# Patient Record
Sex: MALE | Race: WHITE | Employment: FULL TIME | URBAN - METROPOLITAN AREA
[De-identification: names, ages, dates, MRNs, and addresses within clinical notes are randomized per-mention and may not be internally consistent; named-entity substitution may affect disease eponyms.]

---

## 2017-07-07 ENCOUNTER — HOSPITAL ENCOUNTER (OUTPATIENT)
Age: 57
Setting detail: OUTPATIENT SURGERY
Discharge: HOME | End: 2017-07-07
Attending: INTERNAL MEDICINE | Admitting: INTERNAL MEDICINE
Payer: COMMERCIAL

## 2017-07-07 VITALS
TEMPERATURE: 96.9 F | DIASTOLIC BLOOD PRESSURE: 82 MMHG | WEIGHT: 232 LBS | SYSTOLIC BLOOD PRESSURE: 122 MMHG | RESPIRATION RATE: 18 BRPM | HEIGHT: 72 IN | HEART RATE: 97 BPM | OXYGEN SATURATION: 97 % | BODY MASS INDEX: 31.42 KG/M2

## 2017-07-07 PROCEDURE — 77030013991 HC SNR POLYP ENDOSC BSC -A: Performed by: INTERNAL MEDICINE

## 2017-07-07 PROCEDURE — 76040000007: Performed by: INTERNAL MEDICINE

## 2017-07-07 PROCEDURE — 74011250636 HC RX REV CODE- 250/636

## 2017-07-07 PROCEDURE — 74011250636 HC RX REV CODE- 250/636: Performed by: INTERNAL MEDICINE

## 2017-07-07 PROCEDURE — 77030020256 HC SOL INJ NACL 0.9%  500ML: Performed by: INTERNAL MEDICINE

## 2017-07-07 PROCEDURE — 88305 TISSUE EXAM BY PATHOLOGIST: CPT | Performed by: INTERNAL MEDICINE

## 2017-07-07 PROCEDURE — 99153 MOD SED SAME PHYS/QHP EA: CPT | Performed by: INTERNAL MEDICINE

## 2017-07-07 PROCEDURE — 99152 MOD SED SAME PHYS/QHP 5/>YRS: CPT | Performed by: INTERNAL MEDICINE

## 2017-07-07 RX ORDER — DEXTROMETHORPHAN/PSEUDOEPHED 2.5-7.5/.8
1.2 DROPS ORAL
Status: CANCELLED | OUTPATIENT
Start: 2017-07-07

## 2017-07-07 RX ORDER — FLUMAZENIL 0.1 MG/ML
0.2 INJECTION INTRAVENOUS
Status: DISCONTINUED | OUTPATIENT
Start: 2017-07-07 | End: 2017-07-07 | Stop reason: HOSPADM

## 2017-07-07 RX ORDER — MIDAZOLAM HYDROCHLORIDE 1 MG/ML
.5-5 INJECTION, SOLUTION INTRAMUSCULAR; INTRAVENOUS
Status: DISCONTINUED | OUTPATIENT
Start: 2017-07-07 | End: 2017-07-07 | Stop reason: HOSPADM

## 2017-07-07 RX ORDER — EPINEPHRINE 0.1 MG/ML
1 INJECTION INTRACARDIAC; INTRAVENOUS
Status: CANCELLED | OUTPATIENT
Start: 2017-07-07 | End: 2017-07-07

## 2017-07-07 RX ORDER — NALOXONE HYDROCHLORIDE 0.4 MG/ML
0.4 INJECTION, SOLUTION INTRAMUSCULAR; INTRAVENOUS; SUBCUTANEOUS
Status: DISCONTINUED | OUTPATIENT
Start: 2017-07-07 | End: 2017-07-07 | Stop reason: HOSPADM

## 2017-07-07 RX ORDER — FENTANYL CITRATE 50 UG/ML
100 INJECTION, SOLUTION INTRAMUSCULAR; INTRAVENOUS
Status: DISCONTINUED | OUTPATIENT
Start: 2017-07-07 | End: 2017-07-07 | Stop reason: HOSPADM

## 2017-07-07 RX ORDER — SODIUM CHLORIDE 9 MG/ML
100 INJECTION, SOLUTION INTRAVENOUS CONTINUOUS
Status: DISCONTINUED | OUTPATIENT
Start: 2017-07-07 | End: 2017-07-07 | Stop reason: HOSPADM

## 2017-07-07 RX ORDER — ATROPINE SULFATE 0.1 MG/ML
0.5 INJECTION INTRAVENOUS
Status: CANCELLED | OUTPATIENT
Start: 2017-07-07 | End: 2017-07-07

## 2017-07-07 RX ADMIN — SODIUM CHLORIDE 100 ML/HR: 900 INJECTION, SOLUTION INTRAVENOUS at 09:58

## 2017-07-07 NOTE — DISCHARGE INSTRUCTIONS
Vinayak Norton  [id number]  1960    COLON DISCHARGE INSTRUCTIONS    Discomfort:  Redness at IV site- apply warm compress to area; if redness or soreness persist- contact your physician  There may be a slight amount of blood passed from the rectum  Gaseous discomfort- walking, belching will help relieve any discomfort  You may not operate a vehicle til the next day. You may not engage in an occupation involving machinery or appliances til the next day. You may not drink alcoholic beverages til the next day. DIET:   High fiber diet. ACTIVITY:  You may not  resume your normal daily activities til the next day. it is recommended that you spend the remainder of the day resting -  avoid any strenuous activity. CALL M.D.  IF ANY SIGN OF:   Increasing pain, nausea, vomiting  Abdominal distension (swelling)  New increased bleeding (oral or rectal)  Fever (chills)  Pain in chest area  Bloody discharge from nose or mouth  Shortness of breath    You may not  take any Advil, Aspirin, Ibuprofen, Motrin, Aleve, or Goodys for 5 days, ONLY  Tylenol as needed for pain. Post procedure diagnosis:  POLYP, HEMORRHOIDS    Follow-up Instructions: Your follow up colonoscopy will be in 5 years. We will notify you the results of your biopsy by letter within 2 weeks.     Lisbeth Ramos MD  July 7, 2017     DISCHARGE SUMMARY from Nurse    The following personal items collected during your admission are returned to you:   Dental Appliance: Dental Appliances: None  Vision: Visual Aid: Glasses  Hearing Aid:    Jewelry:    Clothing:    Other Valuables:    Valuables sent to safe:              PATIENT INSTRUCTIONS:    After general anesthesia or intravenous sedation, for 24 hours or while taking prescription Narcotics:  · Limit your activities  · Do not drive and operate hazardous machinery  · Do not make important personal or business decisions  · Do  not drink alcoholic beverages  · If you have not urinated within 8 hours after discharge, please contact your surgeon on call. Report the following to your surgeon:  · Excessive pain, swelling, redness or odor of or around the surgical area  · Temperature over 100.5  · Nausea and vomiting lasting longer than 4 hours or if unable to take medications  · Any signs of decreased circulation or nerve impairment to extremity: change in color, persistent  numbness, tingling, coldness or increase pain  · Any questions      No orders of the defined types were placed in this encounter. What to do at Home:  Recommended activity: as above,     If you experience any of the following symptoms as above, please follow up with Dr. Mariely Fatima. *  Please give a list of your current medications to your Primary Care Provider. *  Please update this list whenever your medications are discontinued, doses are      changed, or new medications (including over-the-counter products) are added. *  Please carry medication information at all times in case of emergency situations. These are general instructions for a healthy lifestyle:    No smoking/ No tobacco products/ Avoid exposure to second hand smoke    Surgeon General's Warning:  Quitting smoking now greatly reduces serious risk to your health. Obesity, smoking, and sedentary lifestyle greatly increases your risk for illness    A healthy diet, regular physical exercise & weight monitoring are important for maintaining a healthy lifestyle    You may be retaining fluid if you have a history of heart failure or if you experience any of the following symptoms:  Weight gain of 3 pounds or more overnight or 5 pounds in a week, increased swelling in our hands or feet or shortness of breath while lying flat in bed. Please call your doctor as soon as you notice any of these symptoms; do not wait until your next office visit.     Recognize signs and symptoms of STROKE:    F-face looks uneven    A-arms unable to move or move unevenly    S-speech slurred or non-existent    T-time-call 911 as soon as signs and symptoms begin-DO NOT go       Back to bed or wait to see if you get better-TIME IS BRAIN. The discharge information has been reviewed with the patient and caregiver. The patient and caregiver verbalized understanding. Warning Signs of HEART ATTACK     Call 911 if you have these symptoms:   Chest discomfort. Most heart attacks involve discomfort in the center of the chest that lasts more than a few minutes, or that goes away and comes back. It can feel like uncomfortable pressure, squeezing, fullness, or pain.  Discomfort in other areas of the upper body. Symptoms can include pain or discomfort in one or both arms, the back, neck, jaw, or stomach.  Shortness of breath with or without chest discomfort.  Other signs may include breaking out in a cold sweat, nausea, or lightheadedness. Don't wait more than five minutes to call 911 - MINUTES MATTER! Fast action can save your life. Calling 911 is almost always the fastest way to get lifesaving treatment. Emergency Medical Services staff can begin treatment when they arrive -- up to an hour sooner than if someone gets to the hospital by car. The discharge information has been reviewed with the patient and caregiver. The patient and caregiver verbalized understanding. Discharge medications reviewed with the patient and guardian and appropriate educational materials and side effects teaching were provided.     Patient armband removed and shredded

## 2017-07-07 NOTE — PROCEDURES
[facility]  Colonoscopy Procedure Report  _______________________________________________________  Patient: Guillermo Sabillon                                         Attending Physician: Gerald Dang MD    Patient ID: [id number]                                      Referring Physician: Diana Fu MD    Exam Date: July 7, 2017 _______________________________________________________      Introduction: A  59 y.o. male patient, presents for outpatient Colonoscopy    Indications: Screen colon cancer. Mother had colon cancer at age 59 yo. He is asymptomatic. Had a negative colonoscopy 14 years ago? Consent: The benefits, risks, and alternatives to the procedure were discussed and informed consent was obtained from the patient. Preparation: EKG, pulse, pulse oximetry and blood pressure were monitored throughout the procedure. ASA Classification: Class 2 - . The heart is an S1-S2 and regular heart rate and rhythm. Lungs are clear to auscultation and percussion. Abdomen is soft, nondistended, and nontender. Mental Status: awake, alert, and oriented to person, place, and time    Medications:  · Fentanyl 100 mcg IV before procedure. · Versed 5 mg IV throughout the procedure. Rectal Exam: Normal Rectal Exam. No Blood. Prostate not enlarged. No nodule felt    Pathology Specimens: One specimens removed. Procedure: The colonoscope was passed with ease through the anus under direct visualization and advanced to the cecum and 5 cm inside the terminal ileum. The patient required positioning on the back and external counter pressure to aid in the passage of the scope. The scope was withdrawn and the mucosa was carefully examined. The quality of the preparation was excellent. The views were excellent. The patient's toleration of the procedure was excellent. Retroflexion was preformed in the ascending colon and hepatic flexure. The exam was done twice to the cecum.  Total time is 24 minutes and withdrawal time is 17 minutes. Findings:    Rectum:   Small Internal hemorrhoids. Sigmoid:   normal   Descending Colon:   Normal  Transverse Colon:   3 mm sessile polyp in the transverse colon cold snared  Ascending Colon:   Normal  Cecum:   3 mm sessile polyp in the cecum cold snared   Terminal Ileum:   Normal      Unplanned Events: There were no unplanned events. Estimated Blood Loss: None  Impressions:    Small Internal hemorrhoids. 3 mm sessile polyp in the cecum cold snared and a 3 mm sessile polyp in the transverse colon also cold snared. Normal Mucosa. No diverticula found. Complications: None; patient tolerated the procedure well. Recommendations:  · Discharge home when standard parameters are met. · Resume a high fiber diet. · Colonoscopy recommendation in 5 years.     Procedure Codes:    · Damian Moriah [[id number]]    Endoscope Information:  Model Number(s)    [id number]         Assistant: None      Signed By: Cleone Homans, MD Date: July 7, 2017

## 2017-07-07 NOTE — IP AVS SNAPSHOT
[address] [place] [address] 
[phone] Patient: Marylou Otoole MRN: [id number] FHN:[birth year] You are allergic to the following No active allergies Recent Documentation Height Weight BMI Smoking Status 1.829 m 105.2 kg 31.46 kg/m2 Never Smoker About your hospitalization You were admitted on:  July 7, 2017 You last received care in the:  [facility] ENDOSCOPY You were discharged on:  July 7, 2017 Unit phone number:  [phone] Why you were hospitalized Your primary diagnosis was:  Not on File Providers Seen During Your Hospitalizations Provider Role Specialty Primary office phone Rakesh Rodrigues MD Attending Provider Gastroenterology [phone] Your Primary Care Physician (PCP) Primary Care Physician Office Phone Office Fax Elva Man [phone] Follow-up Information None Current Discharge Medication List  
  
ASK your doctor about these medications Dose & Instructions Dispensing Information Comments Morning Noon Evening Bedtime OTHER Your last dose was: Your next dose is:    
   
   
 Blood pressure med changed and doesn't remember the new med. Refills:  0 Discharge Instructions Marylou Otoole [id number] 
1960 COLON DISCHARGE INSTRUCTIONS Discomfort: 
Redness at IV site- apply warm compress to area; if redness or soreness persist- contact your physician There may be a slight amount of blood passed from the rectum Gaseous discomfort- walking, belching will help relieve any discomfort You may not operate a vehicle til the next day. You may not engage in an occupation involving machinery or appliances til the next day. You may not drink alcoholic beverages til the next day. DIET: 
 High fiber diet.  
  
ACTIVITY: 
 You may not  resume your normal daily activities til the next day. it is recommended that you spend the remainder of the day resting -  avoid any strenuous activity. CALL LEIGH Dixon ANY SIGN OF: Increasing pain, nausea, vomiting Abdominal distension (swelling) New increased bleeding (oral or rectal) Fever (chills) Pain in chest area Bloody discharge from nose or mouth Shortness of breath You may not  take any Advil, Aspirin, Ibuprofen, Motrin, Aleve, or Goodys for 5 days, ONLY  Tylenol as needed for pain. Post procedure diagnosis:  Sarah Vargas Follow-up Instructions: Your follow up colonoscopy will be in 5 years. We will notify you the results of your biopsy by letter within 2 weeks. Edin Paulino MD 
July 7, 2017 DISCHARGE SUMMARY from Nurse The following personal items collected during your admission are returned to you:  
Dental Appliance: Dental Appliances: None Vision: Visual Aid: Glasses Hearing Aid:   
Jewelry:   
Clothing:   
Other Valuables:   
Valuables sent to safe:   
 
 
 
 
 
PATIENT INSTRUCTIONS: 
 
 
F-face looks uneven A-arms unable to move or move unevenly S-speech slurred or non-existent T-time-call 911 as soon as signs and symptoms begin-DO NOT go Back to bed or wait to see if you get better-TIME IS BRAIN. The discharge information has been reviewed with the patient and caregiver. The patient and caregiver verbalized understanding. Warning Signs of HEART ATTACK Call 911 if you have these symptoms: 
? Chest discomfort.  Most heart attacks involve discomfort in the center of the chest that lasts more than a few minutes, or that goes away and comes back. It can feel like uncomfortable pressure, squeezing, fullness, or pain. ? Discomfort in other areas of the upper body. Symptoms can include pain or discomfort in one or both arms, the back, neck, jaw, or stomach. ? Shortness of breath with or without chest discomfort. ? Other signs may include breaking out in a cold sweat, nausea, or lightheadedness. Don't wait more than five minutes to call [address]! Fast action can save your life. Calling 911 is almost always the fastest way to get lifesaving treatment. Emergency Medical Services staff can begin treatment when they arrive  up to an hour sooner than if someone gets to the hospital by car. The discharge information has been reviewed with the patient and caregiver. The patient and caregiver verbalized understanding. Discharge medications reviewed with the patient and guardian and appropriate educational materials and side effects teaching were provided. Patient armband removed and shredded Discharge Orders None Introducing [facility] [place] [facility]! Emeterio Hale introduces [employer] patient portal. Now you can access parts of your medical record, email your doctor's office, and request medication refills online. 1. In your internet browser, go to [email or web address] 2. Click on the First Time User? Click Here link in the Sign In box. You will see the New Member Sign Up page. 3. Enter your [employer] Access Code exactly as it appears below. You will not need to use this code after youve completed the sign-up process. If you do not sign up before the expiration date, you must request a new code. · [employer] Access Code: [email or web address] Expires: 9/20/2017  8:28 AM 
 
4. Enter the last four digits of your Social Security Number (xxxx) and Date of Birth (mm/dd/yyyy) as indicated and click Submit. You will be taken to the next sign-up page. 5. Create a [employer] ID. This will be your [employer] login ID and cannot be changed, so think of one that is secure and easy to remember. 6. Create a [employer] password. You can change your password at any time. 7. Enter your Password Reset Question and Answer. This can be used at a later time if you forget your password. 8. Enter your e-mail address. You will receive e-mail notification when new information is available in [address] 9. Click Sign Up. You can now view and download portions of your medical record. 10. Click the Download Summary menu link to download a portable copy of your medical information. If you have questions, please visit the Frequently Asked Questions section of the [employer] website. Remember, [employer] is NOT to be used for urgent needs. For medical emergencies, dial 911. Now available from your iPhone and Android! General Information Please provide this summary of care documentation to your next provider. Patient Signature:  ____________________________________________________________ Date:  ____________________________________________________________  
  
Abdulkadir Green Provider Signature:  ____________________________________________________________ Date:  ____________________________________________________________

## 2017-07-26 ENCOUNTER — HOSPITAL ENCOUNTER (OUTPATIENT)
Dept: LAB | Age: 57
Discharge: HOME | End: 2017-07-26

## 2017-07-31 ENCOUNTER — HOSPITAL ENCOUNTER (OUTPATIENT)
Dept: NUCLEAR MEDICINE | Age: 57
Discharge: HOME | End: 2017-07-31
Attending: UROLOGY
Payer: COMMERCIAL

## 2017-07-31 DIAGNOSIS — C61 PROSTATE CANCER (HCC): ICD-10-CM

## 2017-07-31 PROCEDURE — 78306 BONE IMAGING WHOLE BODY: CPT

## 2017-08-02 ENCOUNTER — APPOINTMENT (OUTPATIENT)
Dept: GENERAL RADIOLOGY | Age: 57
End: 2017-08-02
Attending: PHYSICIAN ASSISTANT
Payer: COMMERCIAL

## 2017-08-02 ENCOUNTER — HOSPITAL ENCOUNTER (EMERGENCY)
Age: 57
Discharge: HOME | End: 2017-08-02
Attending: FAMILY MEDICINE | Admitting: FAMILY MEDICINE
Payer: COMMERCIAL

## 2017-08-02 ENCOUNTER — APPOINTMENT (OUTPATIENT)
Dept: CT IMAGING | Age: 57
End: 2017-08-02
Attending: PHYSICIAN ASSISTANT
Payer: COMMERCIAL

## 2017-08-02 VITALS
BODY MASS INDEX: 31.83 KG/M2 | HEART RATE: 94 BPM | HEIGHT: 72 IN | SYSTOLIC BLOOD PRESSURE: 137 MMHG | DIASTOLIC BLOOD PRESSURE: 81 MMHG | OXYGEN SATURATION: 95 % | RESPIRATION RATE: 15 BRPM | TEMPERATURE: 99.5 F | WEIGHT: 235 LBS

## 2017-08-02 DIAGNOSIS — C61 PROSTATE CANCER (HCC): ICD-10-CM

## 2017-08-02 DIAGNOSIS — E83.52 HYPERCALCEMIA: Primary | ICD-10-CM

## 2017-08-02 DIAGNOSIS — E83.42 HYPOMAGNESEMIA: ICD-10-CM

## 2017-08-02 DIAGNOSIS — R59.9 ADENOPATHY: ICD-10-CM

## 2017-08-02 PROBLEM — F10.10 ALCOHOL ABUSE: Status: ACTIVE | Noted: 2017-08-02

## 2017-08-02 PROBLEM — I10 ESSENTIAL HYPERTENSION: Chronic | Status: ACTIVE | Noted: 2017-08-02

## 2017-08-02 LAB
ALBUMIN SERPL BCP-MCNC: 3.1 G/DL (ref 3.4–5)
ALBUMIN/GLOB SERPL: 0.8 {RATIO} (ref 0.8–1.7)
ALP SERPL-CCNC: 121 U/L (ref 45–117)
ALT SERPL-CCNC: 33 U/L (ref 16–61)
AMPHET UR QL SCN: NEGATIVE
ANION GAP BLD CALC-SCNC: 7 MMOL/L (ref 3–18)
APPEARANCE UR: CLEAR
AST SERPL W P-5'-P-CCNC: 43 U/L (ref 15–37)
BARBITURATES UR QL SCN: NEGATIVE
BASOPHILS # BLD AUTO: 0 K/UL (ref 0–0.06)
BASOPHILS # BLD: 0 % (ref 0–2)
BENZODIAZ UR QL: NEGATIVE
BILIRUB SERPL-MCNC: 1.3 MG/DL (ref 0.2–1)
BILIRUB UR QL: NEGATIVE
BUN SERPL-MCNC: 13 MG/DL (ref 7–18)
BUN/CREAT SERPL: 9 (ref 12–20)
CALCIUM SERPL-MCNC: 13.7 MG/DL (ref 8.5–10.1)
CANNABINOIDS UR QL SCN: NEGATIVE
CHLORIDE SERPL-SCNC: 100 MMOL/L (ref 100–108)
CO2 SERPL-SCNC: 30 MMOL/L (ref 21–32)
COCAINE UR QL SCN: NEGATIVE
COLOR UR: YELLOW
CREAT SERPL-MCNC: 1.42 MG/DL (ref 0.6–1.3)
DIFFERENTIAL METHOD BLD: ABNORMAL
EOSINOPHIL # BLD: 0.2 K/UL (ref 0–0.4)
EOSINOPHIL NFR BLD: 2 % (ref 0–5)
ERYTHROCYTE [DISTWIDTH] IN BLOOD BY AUTOMATED COUNT: 13 % (ref 11.6–14.5)
GLOBULIN SER CALC-MCNC: 4 G/DL (ref 2–4)
GLUCOSE SERPL-MCNC: 91 MG/DL (ref 74–99)
GLUCOSE UR STRIP.AUTO-MCNC: NEGATIVE MG/DL
HCT VFR BLD AUTO: 40.6 % (ref 36–48)
HDSCOM,HDSCOM: NORMAL
HGB BLD-MCNC: 13.9 G/DL (ref 13–16)
HGB UR QL STRIP: NEGATIVE
KETONES UR QL STRIP.AUTO: NEGATIVE MG/DL
LACTATE SERPL-SCNC: 1.3 MMOL/L (ref 0.4–2)
LEUKOCYTE ESTERASE UR QL STRIP.AUTO: NEGATIVE
LYMPHOCYTES # BLD AUTO: 15 % (ref 21–52)
LYMPHOCYTES # BLD: 1 K/UL (ref 0.9–3.6)
MAGNESIUM SERPL-MCNC: 1.1 MG/DL (ref 1.6–2.6)
MCH RBC QN AUTO: 31.4 PG (ref 24–34)
MCHC RBC AUTO-ENTMCNC: 34.2 G/DL (ref 31–37)
MCV RBC AUTO: 91.9 FL (ref 74–97)
METHADONE UR QL: NEGATIVE
MONOCYTES # BLD: 1.4 K/UL (ref 0.05–1.2)
MONOCYTES NFR BLD AUTO: 20 % (ref 3–10)
NEUTS SEG # BLD: 4.2 K/UL (ref 1.8–8)
NEUTS SEG NFR BLD AUTO: 63 % (ref 40–73)
NITRITE UR QL STRIP.AUTO: NEGATIVE
OPIATES UR QL: NEGATIVE
PCP UR QL: NEGATIVE
PH UR STRIP: 5.5 [PH] (ref 5–8)
PHOSPHATE SERPL-MCNC: 3.8 MG/DL (ref 2.5–4.9)
PLATELET # BLD AUTO: 237 K/UL (ref 135–420)
PMV BLD AUTO: 9.2 FL (ref 9.2–11.8)
POTASSIUM SERPL-SCNC: 3.7 MMOL/L (ref 3.5–5.5)
PROT SERPL-MCNC: 7.1 G/DL (ref 6.4–8.2)
PROT UR STRIP-MCNC: NEGATIVE MG/DL
RBC # BLD AUTO: 4.42 M/UL (ref 4.7–5.5)
SODIUM SERPL-SCNC: 137 MMOL/L (ref 136–145)
SP GR UR REFRACTOMETRY: 1.01 (ref 1–1.03)
UROBILINOGEN UR QL STRIP.AUTO: 0.2 EU/DL (ref 0.2–1)
WBC # BLD AUTO: 6.8 K/UL (ref 4.6–13.2)

## 2017-08-02 PROCEDURE — 96365 THER/PROPH/DIAG IV INF INIT: CPT

## 2017-08-02 PROCEDURE — 81003 URINALYSIS AUTO W/O SCOPE: CPT | Performed by: PHYSICIAN ASSISTANT

## 2017-08-02 PROCEDURE — 96361 HYDRATE IV INFUSION ADD-ON: CPT

## 2017-08-02 PROCEDURE — 99284 EMERGENCY DEPT VISIT MOD MDM: CPT

## 2017-08-02 PROCEDURE — 87040 BLOOD CULTURE FOR BACTERIA: CPT | Performed by: PHYSICIAN ASSISTANT

## 2017-08-02 PROCEDURE — 84100 ASSAY OF PHOSPHORUS: CPT | Performed by: PHYSICIAN ASSISTANT

## 2017-08-02 PROCEDURE — 80053 COMPREHEN METABOLIC PANEL: CPT | Performed by: PHYSICIAN ASSISTANT

## 2017-08-02 PROCEDURE — 85025 COMPLETE CBC W/AUTO DIFF WBC: CPT | Performed by: PHYSICIAN ASSISTANT

## 2017-08-02 PROCEDURE — 83735 ASSAY OF MAGNESIUM: CPT | Performed by: PHYSICIAN ASSISTANT

## 2017-08-02 PROCEDURE — 74011636320 HC RX REV CODE- 636/320: Performed by: FAMILY MEDICINE

## 2017-08-02 PROCEDURE — 93005 ELECTROCARDIOGRAM TRACING: CPT

## 2017-08-02 PROCEDURE — 74011250636 HC RX REV CODE- 250/636: Performed by: PHYSICIAN ASSISTANT

## 2017-08-02 PROCEDURE — 74011250636 HC RX REV CODE- 250/636: Performed by: FAMILY MEDICINE

## 2017-08-02 PROCEDURE — 71020 XR CHEST PA LAT: CPT

## 2017-08-02 PROCEDURE — 80307 DRUG TEST PRSMV CHEM ANLYZR: CPT | Performed by: FAMILY MEDICINE

## 2017-08-02 PROCEDURE — 83605 ASSAY OF LACTIC ACID: CPT | Performed by: PHYSICIAN ASSISTANT

## 2017-08-02 PROCEDURE — 71275 CT ANGIOGRAPHY CHEST: CPT

## 2017-08-02 RX ORDER — MAGNESIUM SULFATE HEPTAHYDRATE 40 MG/ML
2 INJECTION, SOLUTION INTRAVENOUS
Status: DISCONTINUED | OUTPATIENT
Start: 2017-08-02 | End: 2017-08-02 | Stop reason: HOSPADM

## 2017-08-02 RX ORDER — LISINOPRIL 40 MG/1
40 TABLET ORAL
Qty: 30 TAB | Refills: 0 | Status: SHIPPED | OUTPATIENT
Start: 2017-08-02 | End: 2017-08-10

## 2017-08-02 RX ORDER — SODIUM CHLORIDE 0.9 % (FLUSH) 0.9 %
5-10 SYRINGE (ML) INJECTION EVERY 8 HOURS
Status: DISCONTINUED | OUTPATIENT
Start: 2017-08-02 | End: 2017-08-02 | Stop reason: HOSPADM

## 2017-08-02 RX ORDER — LANOLIN ALCOHOL/MO/W.PET/CERES
400 CREAM (GRAM) TOPICAL 2 TIMES DAILY
Qty: 28 TAB | Refills: 0 | Status: ON HOLD | OUTPATIENT
Start: 2017-08-02 | End: 2017-08-09

## 2017-08-02 RX ORDER — SODIUM CHLORIDE 0.9 % (FLUSH) 0.9 %
5-10 SYRINGE (ML) INJECTION AS NEEDED
Status: DISCONTINUED | OUTPATIENT
Start: 2017-08-02 | End: 2017-08-02 | Stop reason: HOSPADM

## 2017-08-02 RX ORDER — LANOLIN ALCOHOL/MO/W.PET/CERES
400 CREAM (GRAM) TOPICAL 2 TIMES DAILY
Status: DISCONTINUED | OUTPATIENT
Start: 2017-08-02 | End: 2017-08-02 | Stop reason: HOSPADM

## 2017-08-02 RX ORDER — MAGNESIUM SULFATE 1 G/100ML
1 INJECTION INTRAVENOUS
Status: COMPLETED | OUTPATIENT
Start: 2017-08-02 | End: 2017-08-02

## 2017-08-02 RX ORDER — LISINOPRIL 20 MG/1
40 TABLET ORAL
Status: DISCONTINUED | OUTPATIENT
Start: 2017-08-02 | End: 2017-08-02 | Stop reason: HOSPADM

## 2017-08-02 RX ADMIN — IOPAMIDOL 100 ML: 755 INJECTION, SOLUTION INTRAVENOUS at 16:26

## 2017-08-02 RX ADMIN — MAGNESIUM SULFATE HEPTAHYDRATE 2 G: 40 INJECTION, SOLUTION INTRAVENOUS at 18:22

## 2017-08-02 RX ADMIN — SODIUM CHLORIDE 3198 ML: 900 INJECTION, SOLUTION INTRAVENOUS at 14:29

## 2017-08-02 RX ADMIN — MAGNESIUM SULFATE HEPTAHYDRATE 1 G: 1 INJECTION, SOLUTION INTRAVENOUS at 16:59

## 2017-08-02 NOTE — ED NOTES
Spoke with Dr. Chris Crowder to clarify admission orders. Patient will be discharged home from ER vs admission to medical unit. Patient made aware.

## 2017-08-02 NOTE — ED TRIAGE NOTES
Patient was seen by Dr. Olinda Hill yesterday and was called today for abnormal lab results. Patient with elevated calcium and decreased magnesium. Sepsis Screening completed    (  )Patient meets SIRS criteria. (x  )Patient does not meet SIRS criteria.       SIRS Criteria is achieved when two or more of the following are present   Temperature < 96.8°F (36°C) or > 100.9°F (38.3°C)   Heart Rate > 90 beats per minute   Respiratory Rate > 20 breaths per minute   WBC count > 12,000 or <4,000 or > 10% bands

## 2017-08-02 NOTE — DISCHARGE SUMMARY
Discharge Summary    Patient: Zackary Dang               Sex: male          DOA: 8/2/2017         YOB: 1960      Age:  59 y.o.        LOS:  LOS: 0 days                Admit Date: 8/2/2017    Discharge Date: 8/2/2017    Admission Diagnoses: Hypercalcemia of malignancy    Discharge Diagnoses:    Problem List as of 8/2/2017  Date Reviewed: 8/2/2017          Codes Class Noted - Resolved    * (Principal)Hypercalcemia of malignancy ICD-10-CM: E83.52  ICD-9-CM: 275.42  8/2/2017 - Present        Hypomagnesemia ICD-10-CM: E83.42  ICD-9-CM: 275.2  8/2/2017 - Present        Prostate cancer metastatic to multiple sites [facility]) ICD-10-CM: C61  ICD-9-CM: 185, 199.0  8/2/2017 - Present        Alcohol abuse ICD-10-CM: F10.10  ICD-9-CM: 305.00  8/2/2017 - Present        Essential hypertension (Chronic) ICD-10-CM: I10  ICD-9-CM: 401.9  8/2/2017 - Present              Discharge Condition: Stable    Hospital Course: Patient evaluated in ED for hypercalcemia and hypomagnesemia. He has known prostate cancer that now seems to have metastasized. I have spoken to him about this he is aware of his diagnosis now, which he was suspecting previously. It is not entirely clear if this is indeed metastatic disease or if it is another primary malignancy, but mets are more likely. He would really like to go home and sleep in his own bed tonight, which I feel is reasonable. He is medically stable and diagnoses can be pursued as an outpatient. 3 total grams of magnesium sulfate were competed prior to discharge. I did also call his PCP and let him know that I spoke to the patient about his dx and what the plan would be moving forward. Mag runs are complete and patient remains stable for DC home. At this point he is medically stable for discharge and has reached the maximum benefit of his hospitalization.       Physical Exam:  Visit Vitals    /81 (BP 1 Location: Right arm, BP Patient Position: At rest)    Pulse 94    Temp 99.5 °F (37.5 °C)    Resp 15    Ht 6' (1.829 m)    Wt 106.6 kg (235 lb)    SpO2 95%    BMI 31.87 kg/m2     General appearance: alert, cooperative, no distress, appears stated age  Head: Normocephalic, without obvious abnormality, atraumatic  Neck: supple, trachea midline, large supraclavicular mass on left  Lungs: coarse bilateral bases L>R  Heart: regular rate and rhythm, S1, S2 normal, no murmur, click, rub or gallop  Abdomen: soft, non-tender. Bowel sounds normal. No masses,  no organomegaly  Extremities: extremities normal, atraumatic, no cyanosis or edema  Skin: Skin color, texture, turgor normal. No rashes or lesions  Neurologic: Grossly normal       Labs: Results:       Chemistry Recent Labs      08/02/17   1444   GLU  91   NA  137   K  3.7   CL  100   CO2  30   BUN  13   CREA  1.42*   CA  13.7*   AGAP  7   BUCR  9*   AP  121*   TP  7.1   ALB  3.1*   GLOB  4.0   AGRAT  0.8      CBC w/Diff Recent Labs      08/02/17   1444   WBC  6.8   RBC  4.42*   HGB  13.9   HCT  40.6   PLT  237   GRANS  63   LYMPH  15*   EOS  2      Cardiac Enzymes No results for input(s): CPK, CKND1, JUAN in the last 72 hours. No lab exists for component: CKRMB, TROIP   Coagulation No results for input(s): PTP, INR, APTT in the last 72 hours. No lab exists for component: INREXT    Lipid Panel No results found for: CHOL, CHOLPOCT, CHOLX, CHLST, CHOLV, 535831, HDL, LDL, LDLC, DLDLP, 954968, VLDLC, VLDL, TGLX, TRIGL, TRIGP, TGLPOCT, CHHD, CHHDX   BNP No results for input(s): BNPP in the last 72 hours. Liver Enzymes Recent Labs      08/02/17   1444   TP  7.1   ALB  3.1*   AP  121*   SGOT  43*      Thyroid Studies No results found for: T4, T3U, TSH, TSHEXT       Consults: None  Treatment Team: Consulting Provider: Yanick Valencia MD; Hospitalist: Nena France DO  Significant Diagnostic Studies: as above    Discharge Medications:   Cannot display discharge medications since this patient is not currently admitted.       Activity: Activity as tolerated    Diet: Resume previous diet    Wound Care: None needed    Follow-up: with PCP in 3-5 days    Megan Perez DO

## 2017-08-02 NOTE — H&P
Medicine History and Physical    Patient: Guy Ramos   Age:  59 y.o. Chief Complaint: Abnormal Lab Results    Assessment/Plan     Hospital Problems  Date Reviewed: 8/2/2017          Codes Class Noted POA    * (Principal)Hypercalcemia of malignancy ICD-10-CM: E83.52  ICD-9-CM: 275.42  8/2/2017 Yes        Hypomagnesemia ICD-10-CM: E83.42  ICD-9-CM: 275.2  8/2/2017 Yes        Prostate cancer metastatic to multiple sites [facility]) ICD-10-CM: C61  ICD-9-CM: 185, 199.0  8/2/2017 Yes        Alcohol abuse ICD-10-CM: F10.10  ICD-9-CM: 305.00  8/2/2017 Yes        Essential hypertension (Chronic) ICD-10-CM: I10  ICD-9-CM: 401.9  8/2/2017 Yes              Patient evaluated in ED for hypercalcemia and hypomagnesemia. He has known prostate cancer that now seems to have metastasized. I have spoken to him about this he is aware of his diagnosis now, which he was suspecting previously. It is not entirely clear if this is indeed metastatic disease or if it is another primary malignancy, but mets are more likely. He would really like to go home and sleep in his own bed tonight, which I feel is reasonable. He is medically stable and diagnoses can be pursued as an outpatient. Will admit under obs status to finish 2 more mag runs and then finish repletion as outpatient. See below for full A/P. Will also call his PCP and let him know what the plan is. Patient agrees with plan.         Hypercalcemia of malignancy (8/2/2017)  - moderate hypercalcemia, asymptomatic   - had 3L NS in ED  - certainly due to bone mets  - no need to aggressively treat this, definitive tx will be for the malignancy      Hypomagnesemia (8/2/2017)  - s/p 1g per ED provider for Mg of 1.1  - would like to give 4g IV over the next 4h but patient would like to go home tonight  - 2g IV magnesium sulfate now over 1h then will dc on PO mag supplement      Prostate cancer metastatic to multiple sites [facility]) (8/2/2017)  - known prostate cancer but seems to now be metastasized   - outpatient follow up with urology appropriate  - defer to urology for ultimate treatment plan      Essential hypertension (8/2/2017)  - stop diuretics in the setting of hypercalcemia  - will bump up lisinopril to 40mg qHS    Dispo: hold in ED obersvation status; anticipated length of stay less than 48 hours or 2 midnights; complete IV mag runs, then DC home; final discharge disposition anticipated: home with close outpatient follow up      HPI:   Kassandra Michel is a 59 y.o. male who presents to the ED for evaluation of abnormal labs (elevated Ca and low Mg) which were concerning in the setting of pre-existing HTN. He was sent her by his PCP, Dr. Chelo Mason. He reports dry cough x 2 months but had a normal CXR. He has also had worsening fatigue during this time. He has a history of prostate cancer managed by Dr. Edmundo Mitchell. He had a normal c-scope 3 weeks ago. He does admit to pelvic pain and a growing mass in his left supra-clavicular space that his daughter just noticed yesterday. Past Medical History:  Past Medical History:   Diagnosis Date    Cancer [facility])     testicle and prostate    Elevated PSA     Hypertension     Prostate cancer [facility])        Past Surgical History:  Past Surgical History:   Procedure Laterality Date    COLONOSCOPY N/A 7/7/2017    COLONOSCOPY, POLYPECTOMY  performed by Yesica Cee MD at [address] [place] [address] Road      tibia right and left; fibula right    HX ORCHIECTOMY      HX ORTHOPAEDIC      HX TONSIL AND ADENOIDECTOMY      HX UROLOGICAL      testicle     HX WRIST FRACTURE TX Left     pins and screws        Family History:  History reviewed. No pertinent family history.     Social History:  Social History     Social History    Marital status:      Spouse name: N/A    Number of children: N/A    Years of education: N/A     Social History Main Topics    Smoking status: Never Smoker    Smokeless tobacco: Never Used    Alcohol use 14.4 oz/week     24 Cans of beer per week    Drug use: No    Sexual activity: Not Asked     Other Topics Concern    None     Social History Narrative    ** Merged History Encounter **            Home Medications:  Prior to Admission medications    Medication Sig Start Date End Date Taking? Authorizing Provider   clobetasol (TEMOVATE) 0.05 % topical cream APPLY A THIN LAYER TO THE AFFECTED AREA(S) 2 TIMES DAILY 6/30/17   Historical Provider   lisinopril-hydroCHLOROthiazide (PRINZIDE, ZESTORETIC) 20-25 mg per tablet TAKE 1 TABLET BY MOUTH EVERY DAY 6/1/17   Historical Provider   LORazepam (ATIVAN) 1 mg tablet TAKE 1 TABLET BY MOUTH 45 MINUTES PRIOR TO PROCEDURE 5/30/17   Historical Provider   losartan-hydroCHLOROthiazide (HYZAAR) 50-12.5 mg per tablet TAKE 1 TABLET BY MOUTH EVERY DAY 6/30/17   Historical Provider   nystatin-triamcinolone (MYCOLOG II) topical cream APPLY TO AFFECTED AREA 2 TIMES EVERY DAY TO ANKEL IN THE MORNING AND EVENING 5/2/17   Historical Provider   SUPREP BOWEL PREP KIT 17.5-3.13-1.6 gram solr oral solution TAKE AS PRESCRIBED BY PHYSICIAN 6/12/17   Historical Provider   OTHER Blood pressure med changed and doesn't remember the new med. Historical Provider       Allergies:  No Known Allergies    Review of Systems  A comprehensive review of systems was negative except for that written in the History of Present Illness.     Physical Exam:     Visit Vitals    /81 (BP 1 Location: Right arm, BP Patient Position: At rest)    Pulse 94    Temp 99.5 °F (37.5 °C)    Resp 15    Ht 6' (1.829 m)    Wt 106.6 kg (235 lb)    SpO2 95%    BMI 31.87 kg/m2       Physical Exam:   General appearance: alert, cooperative, no distress, appears stated age  Head: Normocephalic, without obvious abnormality, atraumatic  Neck: supple, trachea midline, large supraclavicular mass on left  Lungs: coarse bilateral bases L>R  Heart: regular rate and rhythm, S1, S2 normal, no murmur, click, rub or gallop  Abdomen: soft, non-tender. Bowel sounds normal. No masses,  no organomegaly  Extremities: extremities normal, atraumatic, no cyanosis or edema  Skin: Skin color, texture, turgor normal. No rashes or lesions  Neurologic: Grossly normal    Intake and Output:  Current Shift:     Last three shifts:       Lab/Data Reviewed: All lab results for the last 24 hours reviewed. Medications Reviewed.     Dian Smith DO

## 2017-08-02 NOTE — ED PROVIDER NOTES
[address]  EMERGENCY DEPARTMENT HISTORY AND PHYSICAL EXAM       Date: 8/2/2017   Patient Name: Precious Rowan   YOB: 1960  Medical Record Number: [id number]    History of Presenting Illness     Chief Complaint   Patient presents with    Abnormal Lab Results        History Provided By:  patient    Additional History: 1:38 PM  Precious Rowan is a 59 y.o. male who presents to the emergency department for evaluation of abnormal lab results. Pt seen by Dr. Reena Fraire yesterday for dry cough x 2 months (that pt reports is different than allergies) and called in today for abnormal lab results. Pt has elevated new Calcium and decreased new Magnesium and prompted into ED due to cardiac problems. Associated sx include fatigue x 2 months. NKDA. Recent CXR was clean. Pt reports lump in neck was found. PMHx of cancer, HTN (recent medication change 1.5 months ago with no changes to cough), Prostate cancer (verified by MRI; managed by Dr. Alex Norris), and elevated PSA. PSHx of testicular surgery, orthopedic surgery, fracture tx to right Tib/fib and left tibia, pins and screws in wrist, tonsillectomy, adenoidectomy, and a routine colonoscopy 3 weeks ago (no call back; few polyp removals). Pt is a non smoker and an EtOH user. Pt denies SOB, leg swelling, fever, diarrhea, abdominal pain, and any other associated signs and sx.     Primary Care Provider: Carmel Phan MD   Specialist:    Past History     Past Medical History:   Past Medical History:   Diagnosis Date    Cancer [facility])     testicle and prostate    Elevated PSA     Hypertension     Prostate cancer [facility])         Past Surgical History:   Past Surgical History:   Procedure Laterality Date    COLONOSCOPY N/A 7/7/2017    COLONOSCOPY, POLYPECTOMY  performed by Mary Regalado MD at [address]      tibia right and left; fibula right    HX ORCHIECTOMY      HX ORTHOPAEDIC      HX TONSIL AND ADENOIDECTOMY      HX UROLOGICAL      testicle     HX WRIST FRACTURE TX Left     pins and screws         Family History:   History reviewed. No pertinent family history. Social History:   Social History   Substance Use Topics    Smoking status: Never Smoker    Smokeless tobacco: Never Used    Alcohol use 14.4 oz/week     24 Cans of beer per week        Allergies:   No Known Allergies     Review of Systems   Review of Systems   Constitutional: Positive for fatigue and unexpected weight change. Negative for fever. Respiratory: Positive for cough. Negative for shortness of breath. Cardiovascular: Negative for leg swelling. Gastrointestinal: Negative for abdominal pain and diarrhea. Neurological: Positive for weakness (generalized). All other systems reviewed and are negative. Physical Exam  Vitals:    08/02/17 1314 08/02/17 1826   BP: 138/85 137/81   Pulse: (!) 107 94   Resp: 20 15   Temp: 98.7 °F (37.1 °C) 99.5 °F (37.5 °C)   SpO2: 94% 95%   Weight: 106.6 kg (235 lb)    Height: 6' (1.829 m)        Physical Exam   Constitutional: He is oriented to person, place, and time. He appears well-developed and well-nourished. No distress. Overweigh, in NAD   HENT:   Head: Normocephalic and atraumatic. Eyes: Conjunctivae and EOM are normal. Pupils are equal, round, and reactive to light. Neck: Normal range of motion. Neck supple. Cardiovascular: Normal rate and regular rhythm. Pulmonary/Chest: Effort normal and breath sounds normal. No respiratory distress. He has no wheezes. Abdominal: Soft. He exhibits no distension. There is no tenderness. There is no rebound and no guarding. Musculoskeletal: Normal range of motion. He exhibits no edema or tenderness. Lymphadenopathy:        Left: Supraclavicular (golf ball sized slightly moveable non tender fleshy colored) adenopathy present. Neurological: He is alert and oriented to person, place, and time. Skin: Skin is warm and dry.    Psychiatric: He has a normal mood and affect. His behavior is normal.   Nursing note and vitals reviewed. Diagnostic Study Results     Labs -      No results found for this or any previous visit (from the past 12 hour(s)). Radiologic Studies -  The following have been ordered and reviewed:  CTA CHEST W OR W WO CONT   Final Result   IMPRESSION:     1. Technically limited examination as above secondary to persistent coughing.     2. No evidence of large, central pulmonary embolism.     3. Left supraclavicular and multistation mediastinal adenopathy most concerning  for underlying malignancy, with differential considerations including a  manifestation of metastatic disease versus lymphoproliferative process such as  lymphoma.     4. Small hiatal hernia.     Technical limitations and findings of this examination discussed with Lucero Medrano PA-C in the emergency room at the time of the examination. As read by the radiologist.    XR CHEST PA LAT   Final Result   IMPRESSION:     Left lower lobe atelectasis, cannot entirely exclude streaky infiltrate. As read by the radiologist.            Medical Decision Making   I am the first provider for this patient. I reviewed the vital signs, available nursing notes, past medical history, past surgical history, family history and social history. Vital Signs-Reviewed the patient's vital signs. No data found. Pulse Oximetry Analysis - Normal 94% on room air     EKG interpretation: (Preliminary)  Rhythm: NSR. Rate (approx.): 92 bpm; Low voltage QRS. Cannot rule out anterior infarct, age undetermined. EKG read by Marcial Sainz PA-C at 5:17 PM    Old Medical Records: Nursing notes. Procedures:   Procedures    ED Course:  1:38 PM  Initial assessment performed. The patients presenting problems have been discussed, and they are in agreement with the care plan formulated and outlined with them.   I have encouraged them to ask questions as they arise throughout their visit.    2:20 PM  Reviewed EMR nuc med bone scan full body which was performed 2 days ago. Reveals asymmetric uptake involving the medial aspect of the left iliac bone concerning for osseous metastatic disease. 5:22 PM Joel Barrera DO, internal medicine, reviewed the chart. He will put the patient in observation status for electrolyte correction. Will have Mehdi Shelton MD or sena onc evaluate the patient. 5:23 PM  Patient is being admitted to the hospital by Joel Barrera DO to Medical under observation status. The results of their tests and reasons for their admission have been discussed with them and/or available family. They convey agreement and understanding for the need to be admitted and for their admission diagnosis. CONDITIONS ON ADMISSION:  Deep Vein Thrombosis is not present at the time of admission. Thrombosis is not present at the time of admission. Urinary Tract Infection is not present at the time of admission. Pneumonia is not present at the time of admission. MRSA is not present at the time of admission. Wound infection is not present at the time of admission. Pressure Ulcer is not present at the time of admission. Medications Given in the ED:  Medications   sodium chloride 0.9 % bolus infusion 3,198 mL (0 mL/kg × 106.6 kg IntraVENous IV Completed 8/2/17 1853)   magnesium sulfate 1 g/100 ml IVPB (premix or compounded) (0 g IntraVENous IV Completed 8/2/17 1759)   iopamidol (ISOVUE-370) 76 % injection 100 mL (100 mL IntraVENous Given 8/2/17 1626)     Diagnosis   Clinical Impression:   1. Hypercalcemia    2. Hypomagnesemia    3. Adenopathy    4. Prostate cancer ([address]         _______________________________   Attestations: This note is prepared by James Koenig and Melanie Klein, acting as Scribes for Marcial Sainz PA-C on 1:31 PM on 8/2/2017.     Marcial Sainz PA-C: The scribe's documentation has been prepared under my direction and personally reviewed by me in its entirety.   _______________________________

## 2017-08-04 NOTE — PROGRESS NOTES
Robles Pyle, pt's daughter. aware of need to hold anticoagulants per protocol. Denies. aware of  need for  at discharge. aware of arrival time pre procedure. Arrive at [facility] pt registration on Monday 8/7/17 at 1330 for scheduled procedure to occur at 1400. Daughter stated that pt may possibly be admitted to [facility] on Sunday by MD.  states no questions at this time. Gave pt's daughter [facility] rad rn phone number [phone].

## 2017-08-06 ENCOUNTER — HOSPITAL ENCOUNTER (INPATIENT)
Age: 57
LOS: 4 days | Discharge: ANOTHER HOSPITAL | DRG: 824 | End: 2017-08-10
Attending: INTERNAL MEDICINE | Admitting: INTERNAL MEDICINE
Payer: COMMERCIAL

## 2017-08-06 PROBLEM — E86.0 DEHYDRATION: Status: ACTIVE | Noted: 2017-08-06

## 2017-08-06 PROBLEM — E44.0 PROTEIN-CALORIE MALNUTRITION, MODERATE (HCC): Status: ACTIVE | Noted: 2017-08-06

## 2017-08-06 PROBLEM — C79.82 METASTATIC ADENOCARCINOMA TO PROSTATE (HCC): Status: ACTIVE | Noted: 2017-08-06

## 2017-08-06 PROBLEM — R05.9 COUGH: Status: ACTIVE | Noted: 2017-08-06

## 2017-08-06 LAB
ALBUMIN SERPL BCP-MCNC: 2.9 G/DL (ref 3.4–5)
ALBUMIN/GLOB SERPL: 0.7 {RATIO} (ref 0.8–1.7)
ALP SERPL-CCNC: 116 U/L (ref 45–117)
ALT SERPL-CCNC: 33 U/L (ref 16–61)
ANION GAP BLD CALC-SCNC: 13 MMOL/L (ref 3–18)
AST SERPL W P-5'-P-CCNC: 41 U/L (ref 15–37)
BASOPHILS # BLD AUTO: 0 K/UL (ref 0–0.06)
BASOPHILS # BLD: 0 % (ref 0–2)
BILIRUB SERPL-MCNC: 0.9 MG/DL (ref 0.2–1)
BUN SERPL-MCNC: 12 MG/DL (ref 7–18)
BUN/CREAT SERPL: 8 (ref 12–20)
CA-I SERPL-SCNC: 1.64 MMOL/L (ref 1.12–1.32)
CALCIUM SERPL-MCNC: 11.9 MG/DL (ref 8.5–10.1)
CHLORIDE SERPL-SCNC: 100 MMOL/L (ref 100–108)
CO2 SERPL-SCNC: 24 MMOL/L (ref 21–32)
CREAT SERPL-MCNC: 1.42 MG/DL (ref 0.6–1.3)
DIFFERENTIAL METHOD BLD: ABNORMAL
EOSINOPHIL # BLD: 0.2 K/UL (ref 0–0.4)
EOSINOPHIL NFR BLD: 3 % (ref 0–5)
ERYTHROCYTE [DISTWIDTH] IN BLOOD BY AUTOMATED COUNT: 13 % (ref 11.6–14.5)
GLOBULIN SER CALC-MCNC: 4.2 G/DL (ref 2–4)
GLUCOSE SERPL-MCNC: 86 MG/DL (ref 74–99)
HCT VFR BLD AUTO: 38.4 % (ref 36–48)
HGB BLD-MCNC: 13.3 G/DL (ref 13–16)
LDH SERPL L TO P-CCNC: 289 U/L (ref 81–234)
LYMPHOCYTES # BLD AUTO: 17 % (ref 21–52)
LYMPHOCYTES # BLD: 1.2 K/UL (ref 0.9–3.6)
MAGNESIUM SERPL-MCNC: 1.3 MG/DL (ref 1.6–2.6)
MCH RBC QN AUTO: 31.6 PG (ref 24–34)
MCHC RBC AUTO-ENTMCNC: 34.6 G/DL (ref 31–37)
MCV RBC AUTO: 91.2 FL (ref 74–97)
MONOCYTES # BLD: 1.5 K/UL (ref 0.05–1.2)
MONOCYTES NFR BLD AUTO: 20 % (ref 3–10)
NEUTS SEG # BLD: 4.5 K/UL (ref 1.8–8)
NEUTS SEG NFR BLD AUTO: 60 % (ref 40–73)
PLATELET # BLD AUTO: 215 K/UL (ref 135–420)
PMV BLD AUTO: 9.1 FL (ref 9.2–11.8)
POTASSIUM SERPL-SCNC: 3.4 MMOL/L (ref 3.5–5.5)
PROT SERPL-MCNC: 7.1 G/DL (ref 6.4–8.2)
RBC # BLD AUTO: 4.21 M/UL (ref 4.7–5.5)
SODIUM SERPL-SCNC: 137 MMOL/L (ref 136–145)
WBC # BLD AUTO: 7.4 K/UL (ref 4.6–13.2)

## 2017-08-06 PROCEDURE — 82330 ASSAY OF CALCIUM: CPT | Performed by: INTERNAL MEDICINE

## 2017-08-06 PROCEDURE — 83970 ASSAY OF PARATHORMONE: CPT | Performed by: INTERNAL MEDICINE

## 2017-08-06 PROCEDURE — 65270000029 HC RM PRIVATE

## 2017-08-06 PROCEDURE — 83615 LACTATE (LD) (LDH) ENZYME: CPT | Performed by: INTERNAL MEDICINE

## 2017-08-06 PROCEDURE — 74011250637 HC RX REV CODE- 250/637: Performed by: HOSPITALIST

## 2017-08-06 PROCEDURE — 84154 ASSAY OF PSA FREE: CPT | Performed by: INTERNAL MEDICINE

## 2017-08-06 PROCEDURE — 36415 COLL VENOUS BLD VENIPUNCTURE: CPT | Performed by: INTERNAL MEDICINE

## 2017-08-06 PROCEDURE — 80053 COMPREHEN METABOLIC PANEL: CPT | Performed by: INTERNAL MEDICINE

## 2017-08-06 PROCEDURE — 74011250636 HC RX REV CODE- 250/636: Performed by: INTERNAL MEDICINE

## 2017-08-06 PROCEDURE — 85025 COMPLETE CBC W/AUTO DIFF WBC: CPT | Performed by: INTERNAL MEDICINE

## 2017-08-06 PROCEDURE — 83735 ASSAY OF MAGNESIUM: CPT | Performed by: INTERNAL MEDICINE

## 2017-08-06 RX ORDER — CHOLECALCIFEROL (VITAMIN D3) 125 MCG
10 CAPSULE ORAL
Status: DISCONTINUED | OUTPATIENT
Start: 2017-08-06 | End: 2017-08-10 | Stop reason: HOSPADM

## 2017-08-06 RX ORDER — BENZONATATE 100 MG/1
100 CAPSULE ORAL
COMMUNITY
End: 2019-08-02

## 2017-08-06 RX ORDER — DIPHENHYDRAMINE HCL 25 MG
50 CAPSULE ORAL
Status: DISCONTINUED | OUTPATIENT
Start: 2017-08-06 | End: 2017-08-10 | Stop reason: HOSPADM

## 2017-08-06 RX ORDER — TAMSULOSIN HYDROCHLORIDE 0.4 MG/1
0.4 CAPSULE ORAL DAILY
Status: ON HOLD | COMMUNITY
End: 2017-08-09

## 2017-08-06 RX ORDER — SODIUM CHLORIDE 9 MG/ML
125 INJECTION, SOLUTION INTRAVENOUS CONTINUOUS
Status: DISCONTINUED | OUTPATIENT
Start: 2017-08-06 | End: 2017-08-10

## 2017-08-06 RX ORDER — LORAZEPAM 2 MG/ML
1 INJECTION INTRAMUSCULAR
Status: DISCONTINUED | OUTPATIENT
Start: 2017-08-06 | End: 2017-08-10 | Stop reason: HOSPADM

## 2017-08-06 RX ORDER — ZOLEDRONIC ACID 0.04 MG/ML
4 INJECTION, SOLUTION INTRAVENOUS ONCE
Status: COMPLETED | OUTPATIENT
Start: 2017-08-06 | End: 2017-08-06

## 2017-08-06 RX ORDER — HYDROCODONE BITARTRATE AND ACETAMINOPHEN 5; 325 MG/1; MG/1
1 TABLET ORAL
Status: DISCONTINUED | OUTPATIENT
Start: 2017-08-06 | End: 2017-08-10 | Stop reason: HOSPADM

## 2017-08-06 RX ORDER — ACETAMINOPHEN 325 MG/1
650 TABLET ORAL
Status: DISCONTINUED | OUTPATIENT
Start: 2017-08-06 | End: 2017-08-07

## 2017-08-06 RX ADMIN — Medication 10 MG: at 21:35

## 2017-08-06 RX ADMIN — SODIUM CHLORIDE 125 ML/HR: 900 INJECTION, SOLUTION INTRAVENOUS at 18:57

## 2017-08-06 RX ADMIN — ZOLEDRONIC ACID 100 ML: 0.04 INJECTION, SOLUTION INTRAVENOUS at 21:35

## 2017-08-06 NOTE — H&P
History & Physical    Patient: Joleen Duarte MRN: [id number]  CSN: [id number]    YOB: 1960  Age: 59 y.o. Sex: male      DOA: 8/6/2017  Primary Care Provider:  Julieth Weathers MD      Assessment/Plan     Patient Active Problem List   Diagnosis Code    Hypercalcemia of malignancy E83.52    Hypomagnesemia E83.42    Prostate cancer metastatic to multiple sites [facility]) C61    Alcohol abuse F10.10    Essential hypertension I10    Metastatic adenocarcinoma to prostate ([facility] C79.82       1. Prostate cancer possibly metastatic. 2.  Lymphadenopathy. 3.  Hypomagnesemia. 4.  Hypercalcemia severe probably 2/2 malignancy. 5.  Hypoalbuminemia. Mod protein malnutrition. 6.  Dehydration. 7.  Cough x 2-3 months. 8.  Hypertension. 9.  Alcohol use 3-4 beers per night. Complex case. Prostate cancer identified on 1/12 biopsies. Diagnostic studies with possible iliac lesion on left and diffuse large lymphadenopathy throughout the chest and a large supraclavicular lymph node in the neck. Calcium abnormally elevated that might be due to metastatic disease vs cancer secreted PTH vs hyperparathyroidism. Lingering cough present in this non smoker. Common causes such as ACE related, allergies, gerd, uri etc have been excluded. Could it be related to the abnormalities noted on the CT scan? Discussed the case with yo LINDA. [facility]. Reviewed treatment plan that will include iv fluids for sever hypercalcemia, consideration of zometa, and correction of electrolyte abnormalities. DVT px. Dispo TBD. Anticipate dc home in 3 days or so. Do not anticipate any HH or PT/OT needs. CC: Malaise. HPI:     Joleen Duarte is a 59 y.o. male who was referred to Dr. Andrew House  For elevated PSA. He underwent MR guided biopsy at [facility]. MRI w/o abnormalities but one of twelve cores showed Khai 3+4 or 4+3 prostate cancer in a small volume specimen.   MRI mentions possible abnormality of pelvic bones. Diagnosed with prostate cancer in 6/2017. Most recent PSA dated 3/29/17 was 6. Since that time has been exploring treatment options. Initially he was interested in proton and or radiation. Apparently he has a history of testicular cancer in the early 1990's and he received radiation as part of his treatment protocol. On 8/2/17 underwent NM bone scan. Testing showed left sided iliac involvement hat might represent metastatic disease. Moderate uptake was noted in the cervical spine that might be degenerative in quality. Over the past couple months has developed a cough. He was on an ACE. This was stopped. Cough continued. An antihistamine was tried. This did not help the cough. A chest xray was completed as an outpatient that was unrevealing. He has had no purulent sputum or blood. There has been no fevers or chills. A CT scan of the chest was completed on 8/2/17. This demonstrated   Significant left supraclavicular and multi-station mediastinal lymphadenopathy. Radiology commented that this could represent metastatic disease vs a lymphoma. In the past week a palpable lymph node above the left clavicle has appeared. It has been tender. Recent laboratory studies have found hypercalcemia, low albumin and severe dangerously low magnesium. AS he has been doing poorly with this process that has yet to be worked up and as his labs have manifested severe electrolyte abnormalities the decision was made to admit for further management.         Past Medical History:   Diagnosis Date    Cancer [facility])     testicle and prostate    Elevated PSA     Hypertension     Prostate cancer [facility])        Past Surgical History:   Procedure Laterality Date    COLONOSCOPY N/A 7/7/2017    COLONOSCOPY, POLYPECTOMY  performed by Loraine Luciano MD at [address] [place] [address]      tibia right and left; fibula right    HX ORCHIECTOMY      HX ORTHOPAEDIC      HX TONSIL AND ADENOIDECTOMY      HX UROLOGICAL      testicle     HX WRIST FRACTURE TX Left     pins and screws        No family history on file. Social History     Social History    Marital status:      Spouse name: N/A    Number of children: N/A    Years of education: N/A     Social History Main Topics    Smoking status: Never Smoker    Smokeless tobacco: Never Used    Alcohol use 14.4 oz/week     24 Cans of beer per week    Drug use: No    Sexual activity: Not on file     Other Topics Concern    Not on file     Social History Narrative    ** Merged History Encounter **            Prior to Admission medications    Medication Sig Start Date End Date Taking? Authorizing Provider   lisinopril (PRINIVIL, ZESTRIL) 40 mg tablet Take 1 Tab by mouth nightly. 8/2/17   Zara Mabel White, DO   magnesium oxide (MAG-OX) 400 mg tablet Take 1 Tab by mouth two (2) times a day. 8/2/17   Zara Mabel White, DO   clobetasol (TEMOVATE) 0.05 % topical cream APPLY A THIN LAYER TO THE AFFECTED AREA(S) 2 TIMES DAILY 6/30/17   Historical Provider   nystatin-triamcinolone (MYCOLOG II) topical cream APPLY TO AFFECTED AREA 2 TIMES EVERY DAY TO LOYEL IN THE MORNING AND EVENING 5/2/17   Historical Provider   OTHER Blood pressure med changed and doesn't remember the new med. Historical Provider       No Known Allergies    Review of Systems  Gen: + malaise, weak, No weight loss/gain. Heent: No headache, rhinorrhea, epistaxis, ear pain, hearing loss, sinus pain, neck pain/stiffness, sore throat. + tender left lymph node. Heart: No chest pain, palpitations, MARTINEZ, pnd, or orthopnea. Resp: + cough, no hemoptysis, wheezing and shortness of breath. GI: No nausea, vomiting, diarrhea, constipation, melena or hematochezia. : No urinary obstruction, dysuria or hematuria. Derm: No rash, new skin lesion or pruritis. Musc/skeletal: Right sided hip pain. None on left where abnormalities were seen.    Vasc: No edema, cyanosis or claudication. Endo: No heat/cold intolerance, no polyuria,polydipsia or polyphagia. Neuro: No unilateral weakness, numbness, tingling. No seizures. Heme: No easy bruising or bleeding. Physical Exam:     Physical Exam:  There were no vitals taken for this visit. No data recorded. General:  Fatigued, weak. Head:  Normocephalic, without obvious abnormality, atraumatic. Eyes:  Conjunctivae/corneas clear, sclera anicteric, PERRL, EOMs intact. Nose: Nares normal. No drainage or sinus tenderness. Throat: Dry MM. Neck: Large 2-3 cm lymph node left supraclavicular. Lungs:   Clear to auscultation bilaterally. Coughing during exam.       Heart:  Regular rate and rhythm, S1, S2 normal, no murmur, click, rub or gallop. Abdomen: Soft, non-tender. Bowel sounds normal. No masses,  No organomegaly. Extremities: Extremities normal, atraumatic, no cyanosis or edema. Capillary refill normal.   Pulses: 2+ and symmetric all extremities. Skin: Skin color pink/pale/mottled/flushed, turgor normal. No rashes or lesions   Neurologic: CNII-XII intact. No focal motor or sensory deficit. Labs Reviewed: All lab results for the last 24 hours reviewed. Recent Results (from the past 24 hour(s))   CBC WITH AUTOMATED DIFF    Collection Time: 08/06/17  5:10 PM   Result Value Ref Range    WBC 7.4 4.6 - 13.2 K/uL    RBC 4.21 (L) 4.70 - 5.50 M/uL    HGB 13.3 13.0 - 16.0 g/dL    HCT 38.4 36.0 - 48.0 %    MCV 91.2 74.0 - 97.0 FL    MCH 31.6 24.0 - 34.0 PG    MCHC 34.6 31.0 - 37.0 g/dL    RDW 13.0 11.6 - 14.5 %    PLATELET 148 517 - 116 K/uL    MPV 9.1 (L) 9.2 - 11.8 FL    NEUTROPHILS 60 40 - 73 %    LYMPHOCYTES 17 (L) 21 - 52 %    MONOCYTES 20 (H) 3 - 10 %    EOSINOPHILS 3 0 - 5 %    BASOPHILS 0 0 - 2 %    ABS. NEUTROPHILS 4.5 1.8 - 8.0 K/UL    ABS. LYMPHOCYTES 1.2 0.9 - 3.6 K/UL    ABS. MONOCYTES 1.5 (H) 0.05 - 1.2 K/UL    ABS.  EOSINOPHILS 0.2 0.0 - 0.4 K/UL ABS. BASOPHILS 0.0 0.0 - 0.06 K/UL    DF AUTOMATED     METABOLIC PANEL, COMPREHENSIVE    Collection Time: 08/06/17  5:10 PM   Result Value Ref Range    Sodium 137 136 - 145 mmol/L    Potassium 3.4 (L) 3.5 - 5.5 mmol/L    Chloride 100 100 - 108 mmol/L    CO2 24 21 - 32 mmol/L    Anion gap 13 3.0 - 18 mmol/L    Glucose 86 74 - 99 mg/dL    BUN 12 7.0 - 18 MG/DL    Creatinine 1.42 (H) 0.6 - 1.3 MG/DL    BUN/Creatinine ratio 8 (L) 12 - 20      GFR est AA >60 >60 ml/min/1.73m2    GFR est non-AA 51 (L) >60 ml/min/1.73m2    Calcium 11.9 (H) 8.5 - 10.1 MG/DL    Bilirubin, total 0.9 0.2 - 1.0 MG/DL    ALT (SGPT) 33 16 - 61 U/L    AST (SGOT) 41 (H) 15 - 37 U/L    Alk.  phosphatase 116 45 - 117 U/L    Protein, total 7.1 6.4 - 8.2 g/dL    Albumin 2.9 (L) 3.4 - 5.0 g/dL    Globulin 4.2 (H) 2.0 - 4.0 g/dL    A-G Ratio 0.7 (L) 0.8 - 1.7     MAGNESIUM    Collection Time: 08/06/17  5:10 PM   Result Value Ref Range    Magnesium 1.3 (L) 1.6 - 2.6 mg/dL       Procedures/imaging: see electronic medical records for all procedures/Xrays and details which were not copied into this note but were reviewed prior to creation of Plan    CC: Oneida Gaspar MD

## 2017-08-06 NOTE — H&P
Full H+P dictated. Briefly, 60 y/o male mona castro follows with Dr. Mounika Goff for a fairly new diagnosis of prostate cancer. Biopsy completed in [place]. He has been exploring treatment options. Was thinking of proton therapy though this is not an option apparently. Recently seen in office. Labs completed as he was having a lot of malaise. Labs returned with markedly elevated serum calcemia and dangerously low magnesium. In addition to this a new tender lymph node on the left neck has become apparent. It has been increasing in size. A recent nuclear med bone scan noted anomalies in the right hip. He has been having increasing pain there. A CT scan of the chest was completed recently that demonstrated large perhaps pathological lymph nodes in the chest.  With these findings in mind, there is concern for metastatic disease vs the presence of a another malignancy.   As he has been doing poorly and the calcium reflects severe metabolic derangement we have decided to bring him in the hospital for correction of electrolyte abnormalities, rehydration, biopsy of abnml node and consultation with an oncologist.

## 2017-08-07 ENCOUNTER — APPOINTMENT (OUTPATIENT)
Dept: CT IMAGING | Age: 57
DRG: 824 | End: 2017-08-07
Attending: SPECIALIST
Payer: COMMERCIAL

## 2017-08-07 ENCOUNTER — APPOINTMENT (OUTPATIENT)
Dept: GENERAL RADIOLOGY | Age: 57
DRG: 824 | End: 2017-08-07
Attending: SPECIALIST
Payer: COMMERCIAL

## 2017-08-07 ENCOUNTER — HOSPITAL ENCOUNTER (OUTPATIENT)
Dept: ULTRASOUND IMAGING | Age: 57
Discharge: HOME | End: 2017-08-07
Attending: INTERNAL MEDICINE

## 2017-08-07 ENCOUNTER — APPOINTMENT (OUTPATIENT)
Dept: ULTRASOUND IMAGING | Age: 57
DRG: 824 | End: 2017-08-07
Attending: SPECIALIST
Payer: COMMERCIAL

## 2017-08-07 LAB
ANION GAP BLD CALC-SCNC: 9 MMOL/L (ref 3–18)
BUN SERPL-MCNC: 13 MG/DL (ref 7–18)
BUN/CREAT SERPL: 9 (ref 12–20)
CALCIUM SERPL-MCNC: 11.5 MG/DL (ref 8.4–10.4)
CALCIUM SERPL-MCNC: 11.5 MG/DL (ref 8.5–10.1)
CHLORIDE SERPL-SCNC: 104 MMOL/L (ref 100–108)
CO2 SERPL-SCNC: 25 MMOL/L (ref 21–32)
CREAT SERPL-MCNC: 1.4 MG/DL (ref 0.6–1.3)
ERYTHROCYTE [DISTWIDTH] IN BLOOD BY AUTOMATED COUNT: 13.2 % (ref 11.6–14.5)
GLUCOSE SERPL-MCNC: 92 MG/DL (ref 74–99)
HCT VFR BLD AUTO: 35.7 % (ref 36–48)
HGB BLD-MCNC: 12.1 G/DL (ref 13–16)
MAGNESIUM SERPL-MCNC: 1.3 MG/DL (ref 1.6–2.6)
MCH RBC QN AUTO: 30.9 PG (ref 24–34)
MCHC RBC AUTO-ENTMCNC: 33.9 G/DL (ref 31–37)
MCV RBC AUTO: 91.3 FL (ref 74–97)
PLATELET # BLD AUTO: 185 K/UL (ref 135–420)
PMV BLD AUTO: 9.2 FL (ref 9.2–11.8)
POTASSIUM SERPL-SCNC: 3.6 MMOL/L (ref 3.5–5.5)
PTH-INTACT SERPL-MCNC: <6.3 PG/ML (ref 14–72)
RBC # BLD AUTO: 3.91 M/UL (ref 4.7–5.5)
SODIUM SERPL-SCNC: 138 MMOL/L (ref 136–145)
WBC # BLD AUTO: 5.8 K/UL (ref 4.6–13.2)

## 2017-08-07 PROCEDURE — 82397 CHEMILUMINESCENT ASSAY: CPT | Performed by: INTERNAL MEDICINE

## 2017-08-07 PROCEDURE — 65270000029 HC RM PRIVATE

## 2017-08-07 PROCEDURE — 74011250636 HC RX REV CODE- 250/636: Performed by: INTERNAL MEDICINE

## 2017-08-07 PROCEDURE — 88305 TISSUE EXAM BY PATHOLOGIST: CPT | Performed by: INTERNAL MEDICINE

## 2017-08-07 PROCEDURE — 88305 TISSUE EXAM BY PATHOLOGIST: CPT | Performed by: SPECIALIST

## 2017-08-07 PROCEDURE — 85027 COMPLETE CBC AUTOMATED: CPT | Performed by: INTERNAL MEDICINE

## 2017-08-07 PROCEDURE — 07B63ZX EXCISION OF LEFT AXILLARY LYMPHATIC, PERCUTANEOUS APPROACH, DIAGNOSTIC: ICD-10-PCS | Performed by: RADIOLOGY

## 2017-08-07 PROCEDURE — 77030013687 US GUIDE BX LYMPH NOD SUPER

## 2017-08-07 PROCEDURE — 74011250637 HC RX REV CODE- 250/637: Performed by: HOSPITALIST

## 2017-08-07 PROCEDURE — 73502 X-RAY EXAM HIP UNI 2-3 VIEWS: CPT

## 2017-08-07 PROCEDURE — 74011250637 HC RX REV CODE- 250/637: Performed by: INTERNAL MEDICINE

## 2017-08-07 PROCEDURE — 88172 CYTP DX EVAL FNA 1ST EA SITE: CPT | Performed by: SPECIALIST

## 2017-08-07 PROCEDURE — 74011000250 HC RX REV CODE- 250

## 2017-08-07 PROCEDURE — 36415 COLL VENOUS BLD VENIPUNCTURE: CPT | Performed by: INTERNAL MEDICINE

## 2017-08-07 PROCEDURE — 88341 IMHCHEM/IMCYTCHM EA ADD ANTB: CPT | Performed by: INTERNAL MEDICINE

## 2017-08-07 PROCEDURE — 88307 TISSUE EXAM BY PATHOLOGIST: CPT | Performed by: INTERNAL MEDICINE

## 2017-08-07 PROCEDURE — 88173 CYTOPATH EVAL FNA REPORT: CPT | Performed by: SPECIALIST

## 2017-08-07 PROCEDURE — 80048 BASIC METABOLIC PNL TOTAL CA: CPT | Performed by: INTERNAL MEDICINE

## 2017-08-07 PROCEDURE — 74011636320 HC RX REV CODE- 636/320: Performed by: INTERNAL MEDICINE

## 2017-08-07 PROCEDURE — 74177 CT ABD & PELVIS W/CONTRAST: CPT

## 2017-08-07 PROCEDURE — 88342 IMHCHEM/IMCYTCHM 1ST ANTB: CPT | Performed by: INTERNAL MEDICINE

## 2017-08-07 PROCEDURE — 83735 ASSAY OF MAGNESIUM: CPT | Performed by: INTERNAL MEDICINE

## 2017-08-07 RX ORDER — MAGNESIUM SULFATE HEPTAHYDRATE 40 MG/ML
2 INJECTION, SOLUTION INTRAVENOUS ONCE
Status: COMPLETED | OUTPATIENT
Start: 2017-08-07 | End: 2017-08-07

## 2017-08-07 RX ORDER — ACETAMINOPHEN 325 MG/1
650 TABLET ORAL 3 TIMES DAILY
Status: DISCONTINUED | OUTPATIENT
Start: 2017-08-07 | End: 2017-08-08

## 2017-08-07 RX ORDER — LIDOCAINE HYDROCHLORIDE 10 MG/ML
10 INJECTION, SOLUTION EPIDURAL; INFILTRATION; INTRACAUDAL; PERINEURAL
Status: COMPLETED | OUTPATIENT
Start: 2017-08-07 | End: 2017-08-07

## 2017-08-07 RX ORDER — LANOLIN ALCOHOL/MO/W.PET/CERES
400 CREAM (GRAM) TOPICAL DAILY
Status: DISCONTINUED | OUTPATIENT
Start: 2017-08-08 | End: 2017-08-10 | Stop reason: HOSPADM

## 2017-08-07 RX ORDER — TAMSULOSIN HYDROCHLORIDE 0.4 MG/1
0.4 CAPSULE ORAL DAILY
Status: DISCONTINUED | OUTPATIENT
Start: 2017-08-08 | End: 2017-08-10 | Stop reason: HOSPADM

## 2017-08-07 RX ORDER — LIDOCAINE HYDROCHLORIDE 10 MG/ML
INJECTION, SOLUTION EPIDURAL; INFILTRATION; INTRACAUDAL; PERINEURAL
Status: COMPLETED
Start: 2017-08-07 | End: 2017-08-07

## 2017-08-07 RX ADMIN — SODIUM CHLORIDE 125 ML/HR: 900 INJECTION, SOLUTION INTRAVENOUS at 22:16

## 2017-08-07 RX ADMIN — SODIUM CHLORIDE 125 ML/HR: 900 INJECTION, SOLUTION INTRAVENOUS at 02:52

## 2017-08-07 RX ADMIN — SODIUM CHLORIDE 125 ML/HR: 900 INJECTION, SOLUTION INTRAVENOUS at 12:58

## 2017-08-07 RX ADMIN — LIDOCAINE HYDROCHLORIDE 5 ML: 10 INJECTION, SOLUTION EPIDURAL; INFILTRATION; INTRACAUDAL; PERINEURAL at 11:00

## 2017-08-07 RX ADMIN — DIATRIZOATE MEGLUMINE AND DIATRIZOATE SODIUM 30 ML: 600; 100 SOLUTION ORAL; RECTAL at 14:06

## 2017-08-07 RX ADMIN — ACETAMINOPHEN 650 MG: 325 TABLET ORAL at 22:15

## 2017-08-07 RX ADMIN — MAGNESIUM SULFATE HEPTAHYDRATE 2 G: 40 INJECTION, SOLUTION INTRAVENOUS at 12:58

## 2017-08-07 RX ADMIN — IOPAMIDOL 100 ML: 612 INJECTION, SOLUTION INTRAVENOUS at 16:02

## 2017-08-07 RX ADMIN — DIPHENHYDRAMINE HYDROCHLORIDE 50 MG: 25 CAPSULE ORAL at 00:24

## 2017-08-07 RX ADMIN — Medication 10 MG: at 22:15

## 2017-08-07 RX ADMIN — MAGNESIUM SULFATE HEPTAHYDRATE 2 G: 40 INJECTION, SOLUTION INTRAVENOUS at 14:05

## 2017-08-07 RX ADMIN — ACETAMINOPHEN 650 MG: 325 TABLET ORAL at 14:05

## 2017-08-07 RX ADMIN — DIPHENHYDRAMINE HYDROCHLORIDE 50 MG: 25 CAPSULE ORAL at 22:15

## 2017-08-07 RX ADMIN — HYDROCODONE BITARTRATE AND ACETAMINOPHEN 1 TABLET: 5; 325 TABLET ORAL at 22:15

## 2017-08-07 RX ADMIN — ACETAMINOPHEN 650 MG: 325 TABLET ORAL at 00:24

## 2017-08-07 NOTE — PROGRESS NOTES
Hospitalist Progress Note    Patient: Cyrus Kraft MRN: [id number]  CSN: [id number]    YOB: 1960  Age: 59 y.o. Sex: male    DOA: 8/6/2017 LOS:  LOS: 1 day            Assessment/Plan   1. Hypercalcemia, improving < 12  2. hypomagnasemia  3. Mediastinal & supraclavicular lymphadenopathy sp biopsy today  4. Prostate cancer    Plan:  - continue fluids, no indication for bisphosphonate/ steroids at this time  - replete Mg  - awaiting biopsy results, Cat scan ab/pelvis  - discussed at length with patient and family at bedside      Patient Active Problem List   Diagnosis Code    Hypercalcemia of malignancy E83.52    Hypomagnesemia E83.42    Prostate cancer metastatic to multiple sites ([facility] [address] C61    Alcohol abuse F10.10    Essential hypertension I10    Metastatic adenocarcinoma to prostate ([facility] [address] C79.82    Dehydration E86.0    Protein-calorie malnutrition, moderate (HCC) E44.0    Cough R05               Subjective:    cc: hip pain  Pt complains of R hip pain, \"aching\", no radiation, worse w movement, better w rest    + cough persists, dry , positional    Had LN biopsy today, tolerated      REVIEW OF SYSTEMS:  General: No fevers or chills. Cardiovascular: No chest pain or pressure. No palpitations. Pulmonary: No shortness of breath. Gastrointestinal: No nausea, vomiting. Objective:        Vital signs/Intake and Output:  Visit Vitals    /90 (BP 1 Location: Right arm, BP Patient Position: At rest)    Pulse 96    Temp 98.5 °F (36.9 °C)    Resp 16    Ht 6' (1.829 m)    Wt 94.7 kg (208 lb 12.8 oz)    SpO2 96%    BMI 28.32 kg/m2     Current Shift:  08/07 0701 - 08/07 1900  In: 929.2 [I.V.:929.2]  Out: -   Last three shifts:  08/05 1901 - 08/07 0700  In: 9231 [P.O.:240; I.V.:805]  Out: -     Body mass index is 28.32 kg/(m^2).     Physical Exam:  GEN: Alert and oriented times three NAD  EYES: conjunctiva normal, lids with out lesions  HEENT: MMM, No thyromegaly, no lymphadenopathy  HEART: RRR +S1 +S2, no JVD, pulses 2+ distally  LUNGS: CTA B/L no wheezes, rales or rhonchi  ABDOMEN: + BS, soft NT/ND no organomegaly,  No rebound  EXTREMITIES: No edema cyanosis, cap refill normal   SKIN: no rashes or skin breakdown, no nodules, normal turgor  Current Facility-Administered Medications   Medication Dose Route Frequency    magnesium sulfate 2 g/50 ml IVPB (premix or compounded)  2 g IntraVENous ONCE    magnesium sulfate 2 g/50 ml IVPB (premix or compounded)  2 g IntraVENous ONCE    [START ON 8/8/2017] magnesium oxide (MAG-OX) tablet 400 mg  400 mg Oral DAILY    [START ON 8/8/2017] tamsulosin (FLOMAX) capsule 0.4 mg  0.4 mg Oral DAILY    0.9% sodium chloride infusion  125 mL/hr IntraVENous CONTINUOUS    LORazepam (ATIVAN) injection 1 mg  1 mg IntraVENous Q6H PRN    HYDROcodone-acetaminophen (NORCO) 5-325 mg per tablet 1 Tab  1 Tab Oral Q6H PRN    acetaminophen (TYLENOL) tablet 650 mg  650 mg Oral Q6H PRN    melatonin tablet 10 mg  10 mg Oral QHS    diphenhydrAMINE (BENADRYL) capsule 50 mg  50 mg Oral Q6H PRN         All the patient's labs over the past 24 hours were reviewed both during my initial daily workflow process and at the time notated as \"note time\" in [facility]. (It is not time stamped separately in this workflow.)  Select labs are listed below.         Labs: Results:       Chemistry Recent Labs      08/07/17 0238 08/06/17   1710   GLU  92  86   NA  138  137   K  3.6  3.4*   CL  104  100   CO2  25  24   BUN  13  12   CREA  1.40*  1.42*   CA  11.5*  11.5*  11.9*   AGAP  9  13   BUCR  9*  8*   AP   --   116   TP   --   7.1   ALB   --   2.9*   GLOB   --   4.2*   AGRAT   --   0.7*      CBC w/Diff Recent Labs      08/07/17 0238 08/06/17   1710   WBC  5.8  7.4   RBC  3.91*  4.21*   HGB  12.1*  13.3   HCT  35.7*  38.4   PLT  185  215   GRANS   --   60   LYMPH   --   17*   EOS   --   3                      Liver Enzymes Recent Labs      08/06/17   1710   TP 7. 1   ALB  2.9*   AP  116   SGOT  41*          Procedures/imaging: see electronic medical records for all procedures/Xrays and details which were not copied into this note but were reviewed prior to creation of [facility],   Internal Medicine/Geriatrics    Total time spent spent: 40 minutes >50% in care coordination

## 2017-08-07 NOTE — PROGRESS NOTES
Readmission Risk Assessment: Low Risk and MSSP/Good Help ACO patients    RRAT Score: 1 - 12    Initial Assessment: chart reviewed, pt admitted for medical management of hypercalcemia, possibly related to metastatic cancer. No needs anticipated by MD, plan for 3 day hospital stay, pt being followed by oncology. CM available for needs at discharge. Emergency Contact: see face sheet    Pertinent Medical Hx:  HTN, prostate cancer, testicular cancer    PCP/Specialists:  Nick Deras:     DME:  To be determined    Low Risk Care Transition Plan:  1. Evaluate for [facility] or [address] coordination of resources  2. Involve patient/caregiver in assessment, planning, education and implement of intervention. 3. CM daily patient care huddles/interdisciplinary rounds. 4. PCP/Specialist appointment within 7 - 10 days made prior to discharge. 5. Facilitate transportation and logistics for follow-up appointments. 6. Handoff to [facility] Nurse Navigator or PCP practice.

## 2017-08-07 NOTE — PROGRESS NOTES
attempted to visit patient but he was unavailable. Chaplains remain available to provide pastoral support to patient and family as needed and requested. Rev.  Lissette Altamiranoin  [phone]

## 2017-08-07 NOTE — PROGRESS NOTES
2014 Per report, on call hospitalist was informed of HR, and request of medications: per Dr. Thi Gill, pt may have melatonin, and benadryl but avoid other medications at this time. Continue to monitor HR.     2138 Zometa IV started. Pt states he has a right chronic hip pain that is worse in the morning when first rising and improves with rest. Allowed pt to sit as he is comfortable. Explained to him NPO past midnight in preparation for biopsy. Also informed him for minimal medications at this time. 2300 Zometa completed. Pt sleeping. 0024 Temp 100.2F. Tylenol given. SHIFT SUMMARY: Pt cooperative with care and makes jokes. Denies pain from left neck. Able to sleep during the night. NPO since midnight. Ultrasound called this AM and indicated that radiologist and pathologist are coordinating the time for pt's biopsy today.

## 2017-08-07 NOTE — PROCEDURES
Vascular & Interventional Radiology Brief Procedure Note    Interventional Radiologist: Angus Espinoza    Pre-operative Diagnosis:  Mediastinal and left supraclavicular lymphadenopathy. Post-operative Diagnosis: Same as pre-op dx    Procedure(s) Performed:  Ultrasound guided fine needle aspiration (FNA) and core biopsy of left supraclavicular lymph nodes. Anesthesia:  Local.    Findings:    1. Successful FNA (25 G) of left supraclavicular lymph node. Initial interpretation (per Dr. Malathi Trinidad, pathology) showed epithelial lesion. Total 3 passes performed. 2.  Successful core biopsy (20 G) of larger adjacent lymph node. Total 5 passes performed. Complications: None    Estimated Blood Loss:  minimal    Tubes and Drains: None    Specimens:  3-25 G FNA;  5-20 G cores. Condition: Good    Disposition:  Back to floor. Plan:  Per primary service. Await final pathology.       Genaro Frank MD, MD  [facility]  Vascular & Interventional Radiology  8/7/2017

## 2017-08-07 NOTE — CONSULTS
[address] [place] [address] Rd    Name:  Loraine Green  MR#:  [id number]  :  1960  Account #:  [de-identified]  Date of Adm:  2017  Date of Consultation:  2017      IMPRESSION  1. Patient with a small focus of prostate cancer in the left apex only,  [place] 4+3.  2. Extensive lymphadenopathy involving the left supraclavicular and  also mediastinal nodes so far, unlikely related to prostate cancer. Differential noted below. 3. Hypercalcemia, unlikely related to prostate cancer. 4. Bone abnormality in the left iliac bone medially on bone scan. Cervical spine shows degenerative changes. 5. A 20-25 pound weight loss. No fever and no night sweats. 6. No formal imaging of the abdomen and pelvis yet. 7. 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 DIFFERENTIAL DIAGNOSES  At the current time mostly consistent of:  1. Non-Hodgkin lymphoma given the rapid growth. Some of these are  associated with hypercalcemia. 2. Lung cancer, although he is a lifelong nonsmoker and no history of  asbestos exposure. He has had radiation and it is not clear if the chest  was included as was sometimes done for seminomas in years past.  3. Other solid tumor is a possibility as well. 4. This is not likely related directly to testicular cancer given the long  interval and his age, etc.    RECOMMENDATIONS  1. I ordered a core biopsy of the left supraclavicular node with low path  review and molecular studies. If these are nondiagnostic, he will need  a formal node biopsy intact, but I think most cases can be diagnosed  nowadays with core biopsy. 2. Imaging of the abdomen and pelvis. 3. X-ray of the pelvic bone. He might need an MRI of the pelvis too. 4. If lymphoma, he will need a marrow aspirate and biopsy. I would  prefer this be done on the involved side on bone scan to increase  yield.   5. Parathormone related peptide in addition to the PTH previously  ordered. 6. Further recommendations after biopsy. HISTORY OF PRESENT ILLNESS: This is a very nice man who was  admitted with hypercalcemia and a history of prostate cancer. The  prostate cancer was a [place] 4+3 and involved only a single biopsy  of the left apex. The other 11 biopsies were negative. He also on bone  scan that was done had an iliac lesion on the left with diffuse  adenopathy seen in the chest on chest x-ray. CT confirmed the same. There is a deviation of the trachea to the right on CT scan, and the  patient noted that the left supraclavicular node grew rapidly over the  past 2-3 weeks. He was noted to be hypocalcemic over 13 mg/dL, it  has since fallen in the high 11s, but nonetheless remains elevated. The  patient has lost about 20-25 pounds in weight. None of this is likely  related to prostate cancer. 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 He is here without  so far a tissue diagnosis to account for the adenopathy. I suspect his  hypercalcemia might be related to bone involvement. He has been  under evaluation by the radiation physicians for possible RT, but I do  not think he is likely to be a candidate with his prior RT for testicular  cancer. PAST MEDICAL HISTORY: In addition includes multiple orthopedic  problems stemming from an injury as a child. He has had hypertension  as well. The elevated PSA that is in single digits and otherwise  unremarkable. SURGERIES: Include health maintenance colonoscopy, multiple  fractures on the left and right tibia and fibula, history of right  orchiectomy, history of multiple orthopedic surgeries, history of T and  A, and wrist fracture; so mostly orthopedic issues. CURRENT MEDICATIONS  1. Lisinopril. 2. Mag oxide. 3. Clobetasol. 4. Mycolog. REVIEW OF SYSTEMS: Positive for poor appetite, malaise,  weakness, enlarging left  supraclavicular node, chronic dry,  nonproductive cough without chest pain or hemoptysis. He has had  right-sided hip pain, none on the left surprisingly. Remaining review of  systems is entirely negative. PHYSICAL EXAMINATION  GENERAL: He is alert, cooperative, comfortable. No icterus. NOSE, MOUTH AND THROAT: Unremarkable. NECK: Shows a large mass, golf ball sized node, on the left. I think  there is more than 1. It is definitely apparent even without contact. Right supraclavicular area is negative. AXILLA: Bilaterally negative. No inguinal or femoral nodes either. LUNGS: Completely clear. HEART: Regular rate and rhythm without murmur, gallop or rub. ABDOMEN: Spleen tip palpable and liver edge palpable, neither  tender. No other masses. Bowel sounds are normal.  EXTREMITIES: Without cyanosis, clubbing, or edema. Good pulses. SKIN: Intact. No rash. NEUROLOGIC: Exam is normal.    LABORATORY DATA: White count is 5800, hemoglobin 12.1, platelets  386. Chemistries show an albumin that is low 3.1, repeat 2.9. His  alkaline phosphatase is unremarkable. LDH was 289, calcium initially  13.7, currently 11.5. Electrolytes are largely unremarkable. Magnesium  is a bit low at 1.3. Parathormone is pending. Surgical path from his  prostate biopsy is available from late June and shows only an  adenocarcinoma, [place] 4+3 and less than 5% of that core, and that  is the only core that showed positivity and that involved the left apex  medially. A CT scan was reviewed personally and shows significant  mediastinal adenopathy, pushing the trachea to the right. This is  amenable to IVUS should that become necessary, but unlikely given  the availability of a large supraclavicular node.  A nuclear medicine  scan and bone scan showed uptake in the medial left iliac bone,  suggesting bone involvement. There is also probably degenerative  changes in the C-spine. All of these were personally reviewed.         MD DINH Ellis / Dick Mcknight  D:  08/07/2017   07:23  T:  08/07/2017   09:57  Job #:  [id number]

## 2017-08-07 NOTE — PROGRESS NOTES
Per Dr Chiquis Mann; Pt's CrCl= 66.9; BUN/SCr = 12/1. 42. Pt dx with Hypercalcemia of Malignancy. Ionized Ca = 1.64; free Ca = 11.9. Zolendronic Acid 4mg/100ml to be run over 30 minutes. Monitor Ca. Reduction in Ca levels is seen 24 - 48 hrs; max response up to 7 days. BMP ordered daily.

## 2017-08-07 NOTE — INTERDISCIPLINARY ROUNDS
Interdisciplinary Round Note   Patient Information: Faith Jurado                                      306/01 Reason for Admission: Hypercalcemia  Pain Control  Metastatic adenocarcinoma to prostate [facility])  Quality Measure: Not applicable            Attending Provider:   Javi Fu MD  Primary Care Physician:       Javi Fu MD       [phone]  Consulting:  IP CONSULT TO ONCOLOGY  IDR Rounding Physician:  Past Medical History:   Past Medical History:   Diagnosis Date    Cancer [facility])     testicle and prostate    Elevated PSA     Hypertension     Prostate cancer [facility])         Hospital day: 1                          - - -  Estimated discharge date:   RRAT Score: Low Risk            10       Total Score        3 Has Seen PCP in Last 6 Months (Yes=3, No=0)    7 Charlson Comorbidity Score (Age + Comorbid Conditions)        Criteria that do not apply:    . Living with Significant Other. Assisted Living. LTAC. SNF. or   Rehab    Patient Length of Stay (>5 days = 3)    IP Visits Last 12 Months (1-3=4, 4=9, >4=11)    Pt.  Coverage (Medicare=5 , Medicaid, or Self-Pay=4)         Primary Goals for Today: Lymph node biopsy    Secondary Goals for Today: nutrition status    Overnight Events: none    Tse: no    Central Line: no    Isolation: no       IV Antibiotics? none       When started: ***  Current Medication List:          Current Facility-Administered Medications   Medication Dose Route Frequency    0.9% sodium chloride infusion  125 mL/hr IntraVENous CONTINUOUS    LORazepam (ATIVAN) injection 1 mg  1 mg IntraVENous Q6H PRN    HYDROcodone-acetaminophen (NORCO) 5-325 mg per tablet 1 Tab  1 Tab Oral Q6H PRN    acetaminophen (TYLENOL) tablet 650 mg  650 mg Oral Q6H PRN    melatonin tablet 10 mg  10 mg Oral QHS    diphenhydrAMINE (BENADRYL) capsule 50 mg  50 mg Oral Q6H PRN      VTE Prophylaxis                                 Lines, Drains, & Airways  Peripheral IV 08/06/17 Left Arm-Site Assessment: Clean, dry, & intact     Vital signs:  Visit Vitals    /81 (BP 1 Location: Right arm, BP Patient Position: At rest;Supine)    Pulse 95    Temp 98.7 °F (37.1 °C)    Resp 16    Ht 6' (1.829 m)    Wt 94.7 kg (208 lb 12.8 oz)    SpO2 95%    BMI 28.32 kg/m2        Intake and Output:   08/05 0701 - 08/06 1900  In: 240 [P.O.:240]  Out: -   08/06 1901 - 08/07 0700  In: 805 [I.V.:805]  Out: -   Last Bowel Movement Date: 08/05/17 (pt stated)     Stool Appearance: Soft           Current Diet: DIET NPO With Sips of Clear Fluids       Abdominal   Last Bowel Movement Date: 08/05/17 (pt stated)  Stool Appearance: Soft  Nutrition  Chewing/Swallowing Problems: No  Difficulty with Secretions: No  Speech Slurred/Thick/Garbled: No    NGT: no    Feeding Tube: no   Recent Glucose Results:   Lab Results   Component Value Date/Time    GLU 92 08/07/2017 02:38 AM    GLU 86 08/06/2017 05:10 PM    GI Prophylaxis: no        Type: ***        Activity Level:   Activity Level: Up ad jyoti    Needs assistance with ADLs: no  PT Consult Status: no  Equipment: no  Surgical/Ortho Notes: no   Current Immunizations:  Immunization History   Administered Date(s) Administered    Tdap 11/09/2005     RRAT Score:     Readmit Risk Tool  Support Systems: Child(madyson)  Relationship with Primary Physician Group: Seen at least one time within the past 6 months   Recommendations:       Discharge Disposition: pending    Needs for Discharge: ***           Recommendations from IDR team: ***    Other Notes: ***

## 2017-08-07 NOTE — ROUTINE PROCESS
Bedside and Verbal shift change report given to TOD Horvath RN (oncoming nurse) by Annalise De La Torre RN  (offgoing nurse). Report included the following information SBAR, Kardex, Intake/Output and MAR.

## 2017-08-07 NOTE — CONSULTS
Consult dictated. [id number]   Patient with small focus of prostate ca [place] 4+3 in single focus left apex   Adenopathy unlikely related to this    Hypercalcemia unlikely related to prostate cancer    Bone abnormality left iliac bone medially on bone scan, C spine uptake probably degenerative    Huge lymphadenopathy in Left SC region, rapid enlargement    Large nodes in mediastinum deviating trachea to right    20-25 pound weight loss, No fever, No night sweats    No formal imaging of abdomen or pelvis yet.     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 [address]

## 2017-08-07 NOTE — ROUTINE PROCESS
Bedside and Verbal shift change [facility] ([facility]) by Bibi Nixon RN (offgoing nurse). Report included the following information SBAR, Kardex, Intake/Output and MAR.

## 2017-08-07 NOTE — PROGRESS NOTES
Physical Therapy Screening:  Services are not indicated at this time. An InBasket screening referral was triggered for physical therapy based on results obtained during the nursing admission assessment. The patients chart was reviewed and the patient is not appropriate for a skilled therapy evaluation at this time. Please consult physical therapy if any therapy needs arise. Thank you.     Bard Kimberly Quan PT, DPT

## 2017-08-07 NOTE — PROGRESS NOTES
0730-received report from WILTON Lopez RN includeed SBAR MAR and Kardex  0810-assessed pt sleeping in bed no apparent distress IV in progress. 1400-no change in status  1700-called Dr Lauren Way for diet order regular diet ordered. 1800-shift summary awaiting test results  Bedside and Verbal shift change report given to Tiki Singh RN (oncoming nurse) by Indra Acosta RN (offgoing nurse). Report included the following information SBAR, Kardex and MAR.

## 2017-08-07 NOTE — PROGRESS NOTES
1715-Patient arrived on the floor. Alert and oriented x 4. IV site started in left forearm, 22 gauge. NS started at 125cc/hr. Medications reviewed. Pain rated at 0/10. Large mass to left side of neck. Dr. Chiquis Mann in to talk with patient and family. Shift summary-Patient up ad jyoti. Ambulating with steady gait. Family at bedside. IV fluids infusing without difficulty. Alert and oriented x 4. Continues to have no c/o pain.

## 2017-08-08 ENCOUNTER — APPOINTMENT (OUTPATIENT)
Dept: MRI IMAGING | Age: 57
DRG: 824 | End: 2017-08-08
Attending: SPECIALIST
Payer: COMMERCIAL

## 2017-08-08 ENCOUNTER — APPOINTMENT (OUTPATIENT)
Dept: INTERVENTIONAL RADIOLOGY/VASCULAR | Age: 57
DRG: 824 | End: 2017-08-08
Attending: SPECIALIST
Payer: COMMERCIAL

## 2017-08-08 LAB
ANION GAP BLD CALC-SCNC: 10 MMOL/L (ref 3–18)
APTT PPP: 34.8 SEC (ref 23–36.4)
BACTERIA SPEC CULT: NORMAL
BUN SERPL-MCNC: 12 MG/DL (ref 7–18)
BUN/CREAT SERPL: 9 (ref 12–20)
CALCIUM SERPL-MCNC: 10.4 MG/DL (ref 8.5–10.1)
CHLORIDE SERPL-SCNC: 104 MMOL/L (ref 100–108)
CO2 SERPL-SCNC: 23 MMOL/L (ref 21–32)
CREAT SERPL-MCNC: 1.38 MG/DL (ref 0.6–1.3)
GLUCOSE SERPL-MCNC: 98 MG/DL (ref 74–99)
INR PPP: 1.2 (ref 0.8–1.2)
MAGNESIUM SERPL-MCNC: 1.9 MG/DL (ref 1.6–2.6)
POTASSIUM SERPL-SCNC: 3.4 MMOL/L (ref 3.5–5.5)
PROTHROMBIN TIME: 14.4 SEC (ref 11.5–15.2)
PSA FREE MFR SERPL: 6.4 %
PSA FREE SERPL-MCNC: 0.34 NG/ML
PSA SERPL-MCNC: 5.3 NG/ML (ref 0–4)
SERVICE CMNT-IMP: NORMAL
SODIUM SERPL-SCNC: 137 MMOL/L (ref 136–145)

## 2017-08-08 PROCEDURE — 74011250636 HC RX REV CODE- 250/636: Performed by: RADIOLOGY

## 2017-08-08 PROCEDURE — 65270000029 HC RM PRIVATE

## 2017-08-08 PROCEDURE — 99156 MOD SED OTH PHYS/QHP 5/>YRS: CPT

## 2017-08-08 PROCEDURE — 87389 HIV-1 AG W/HIV-1&-2 AB AG IA: CPT | Performed by: SPECIALIST

## 2017-08-08 PROCEDURE — 74011250636 HC RX REV CODE- 250/636: Performed by: SPECIALIST

## 2017-08-08 PROCEDURE — 80048 BASIC METABOLIC PNL TOTAL CA: CPT | Performed by: INTERNAL MEDICINE

## 2017-08-08 PROCEDURE — A9585 GADOBUTROL INJECTION: HCPCS | Performed by: INTERNAL MEDICINE

## 2017-08-08 PROCEDURE — 85610 PROTHROMBIN TIME: CPT | Performed by: SPECIALIST

## 2017-08-08 PROCEDURE — 77010033678 HC OXYGEN DAILY

## 2017-08-08 PROCEDURE — 83735 ASSAY OF MAGNESIUM: CPT | Performed by: INTERNAL MEDICINE

## 2017-08-08 PROCEDURE — 74011000250 HC RX REV CODE- 250: Performed by: RADIOLOGY

## 2017-08-08 PROCEDURE — 74011250637 HC RX REV CODE- 250/637: Performed by: INTERNAL MEDICINE

## 2017-08-08 PROCEDURE — 87340 HEPATITIS B SURFACE AG IA: CPT | Performed by: SPECIALIST

## 2017-08-08 PROCEDURE — 74011250637 HC RX REV CODE- 250/637: Performed by: HOSPITALIST

## 2017-08-08 PROCEDURE — 85730 THROMBOPLASTIN TIME PARTIAL: CPT | Performed by: SPECIALIST

## 2017-08-08 PROCEDURE — 93306 TTE W/DOPPLER COMPLETE: CPT

## 2017-08-08 PROCEDURE — 74011250636 HC RX REV CODE- 250/636: Performed by: INTERNAL MEDICINE

## 2017-08-08 PROCEDURE — 02H633Z INSERTION OF INFUSION DEVICE INTO RIGHT ATRIUM, PERCUTANEOUS APPROACH: ICD-10-PCS | Performed by: RADIOLOGY

## 2017-08-08 PROCEDURE — 99153 MOD SED SAME PHYS/QHP EA: CPT

## 2017-08-08 PROCEDURE — 76937 US GUIDE VASCULAR ACCESS: CPT

## 2017-08-08 PROCEDURE — 36415 COLL VENOUS BLD VENIPUNCTURE: CPT | Performed by: INTERNAL MEDICINE

## 2017-08-08 PROCEDURE — 70553 MRI BRAIN STEM W/O & W/DYE: CPT

## 2017-08-08 PROCEDURE — 0JH60XZ INSERTION OF TUNNELED VASCULAR ACCESS DEVICE INTO CHEST SUBCUTANEOUS TISSUE AND FASCIA, OPEN APPROACH: ICD-10-PCS | Performed by: RADIOLOGY

## 2017-08-08 RX ORDER — HEPARIN SODIUM (PORCINE) LOCK FLUSH IV SOLN 100 UNIT/ML 100 UNIT/ML
300-500 SOLUTION INTRAVENOUS AS NEEDED
Status: DISCONTINUED | OUTPATIENT
Start: 2017-08-08 | End: 2017-08-10 | Stop reason: HOSPADM

## 2017-08-08 RX ORDER — FENTANYL CITRATE 50 UG/ML
25-200 INJECTION, SOLUTION INTRAMUSCULAR; INTRAVENOUS
Status: DISCONTINUED | OUTPATIENT
Start: 2017-08-08 | End: 2017-08-10 | Stop reason: HOSPADM

## 2017-08-08 RX ORDER — POTASSIUM CHLORIDE 20 MEQ/1
40 TABLET, EXTENDED RELEASE ORAL
Status: COMPLETED | OUTPATIENT
Start: 2017-08-08 | End: 2017-08-08

## 2017-08-08 RX ORDER — HEPARIN SODIUM (PORCINE) LOCK FLUSH IV SOLN 100 UNIT/ML 100 UNIT/ML
500 SOLUTION INTRAVENOUS
Status: COMPLETED | OUTPATIENT
Start: 2017-08-08 | End: 2017-08-08

## 2017-08-08 RX ORDER — MIDAZOLAM HYDROCHLORIDE 1 MG/ML
1-4 INJECTION, SOLUTION INTRAMUSCULAR; INTRAVENOUS
Status: DISCONTINUED | OUTPATIENT
Start: 2017-08-08 | End: 2017-08-10 | Stop reason: HOSPADM

## 2017-08-08 RX ORDER — SODIUM CHLORIDE 9 MG/ML
25 INJECTION, SOLUTION INTRAVENOUS CONTINUOUS
Status: DISCONTINUED | OUTPATIENT
Start: 2017-08-08 | End: 2017-08-10 | Stop reason: HOSPADM

## 2017-08-08 RX ORDER — LIDOCAINE HYDROCHLORIDE 10 MG/ML
1-20 INJECTION INFILTRATION; PERINEURAL
Status: COMPLETED | OUTPATIENT
Start: 2017-08-08 | End: 2017-08-08

## 2017-08-08 RX ORDER — FLUMAZENIL 0.1 MG/ML
0.2 INJECTION INTRAVENOUS AS NEEDED
Status: DISCONTINUED | OUTPATIENT
Start: 2017-08-08 | End: 2017-08-10 | Stop reason: HOSPADM

## 2017-08-08 RX ORDER — OXYCODONE HYDROCHLORIDE 5 MG/1
5 TABLET ORAL
Qty: 30 TAB | Refills: 0 | Status: SHIPPED | OUTPATIENT
Start: 2017-08-08 | End: 2017-08-09

## 2017-08-08 RX ORDER — CEFAZOLIN SODIUM 2 G/50ML
2 SOLUTION INTRAVENOUS ONCE
Status: COMPLETED | OUTPATIENT
Start: 2017-08-08 | End: 2017-08-08

## 2017-08-08 RX ORDER — LIDOCAINE HYDROCHLORIDE AND EPINEPHRINE 10; 10 MG/ML; UG/ML
1-20 INJECTION, SOLUTION INFILTRATION; PERINEURAL
Status: COMPLETED | OUTPATIENT
Start: 2017-08-08 | End: 2017-08-08

## 2017-08-08 RX ORDER — NALOXONE HYDROCHLORIDE 0.4 MG/ML
0.2 INJECTION, SOLUTION INTRAMUSCULAR; INTRAVENOUS; SUBCUTANEOUS
Status: DISCONTINUED | OUTPATIENT
Start: 2017-08-08 | End: 2017-08-10 | Stop reason: HOSPADM

## 2017-08-08 RX ORDER — OXYCODONE HYDROCHLORIDE 5 MG/1
5 TABLET ORAL
Status: DISCONTINUED | OUTPATIENT
Start: 2017-08-08 | End: 2017-08-10 | Stop reason: HOSPADM

## 2017-08-08 RX ORDER — HEPARIN SODIUM 200 [USP'U]/100ML
500 INJECTION, SOLUTION INTRAVENOUS
Status: COMPLETED | OUTPATIENT
Start: 2017-08-08 | End: 2017-08-08

## 2017-08-08 RX ADMIN — Medication 400 MG: at 09:45

## 2017-08-08 RX ADMIN — HYDROCODONE BITARTRATE AND ACETAMINOPHEN 1 TABLET: 5; 325 TABLET ORAL at 16:40

## 2017-08-08 RX ADMIN — HEPARIN SODIUM (PORCINE) LOCK FLUSH IV SOLN 100 UNIT/ML 400 UNITS: 100 SOLUTION at 13:59

## 2017-08-08 RX ADMIN — FENTANYL CITRATE 50 MCG: 50 INJECTION, SOLUTION INTRAMUSCULAR; INTRAVENOUS at 13:40

## 2017-08-08 RX ADMIN — PAMIDRONATE DISODIUM 90 MG: 9 INJECTION, SOLUTION INTRAVENOUS at 11:33

## 2017-08-08 RX ADMIN — TAMSULOSIN HYDROCHLORIDE 0.4 MG: 0.4 CAPSULE ORAL at 09:45

## 2017-08-08 RX ADMIN — CEFAZOLIN SODIUM 2 G: 2 SOLUTION INTRAVENOUS at 13:11

## 2017-08-08 RX ADMIN — SODIUM CHLORIDE 125 ML/HR: 900 INJECTION, SOLUTION INTRAVENOUS at 06:16

## 2017-08-08 RX ADMIN — HEPARIN SODIUM 500 UNITS: 200 INJECTION, SOLUTION INTRAVENOUS at 13:40

## 2017-08-08 RX ADMIN — GADOBUTROL 7.5 ML: 604.72 INJECTION INTRAVENOUS at 10:23

## 2017-08-08 RX ADMIN — MIDAZOLAM HYDROCHLORIDE 1 MG: 1 INJECTION, SOLUTION INTRAMUSCULAR; INTRAVENOUS at 13:40

## 2017-08-08 RX ADMIN — LIDOCAINE HYDROCHLORIDE,EPINEPHRINE BITARTRATE 4 ML: 10; .01 INJECTION, SOLUTION INFILTRATION; PERINEURAL at 13:40

## 2017-08-08 RX ADMIN — SODIUM CHLORIDE 125 ML/HR: 900 INJECTION, SOLUTION INTRAVENOUS at 16:41

## 2017-08-08 RX ADMIN — POTASSIUM CHLORIDE 40 MEQ: 20 TABLET, EXTENDED RELEASE ORAL at 06:41

## 2017-08-08 RX ADMIN — Medication 10 MG: at 22:06

## 2017-08-08 RX ADMIN — LIDOCAINE HYDROCHLORIDE 3 ML: 10 INJECTION, SOLUTION INFILTRATION; PERINEURAL at 13:40

## 2017-08-08 RX ADMIN — HYDROCODONE BITARTRATE AND ACETAMINOPHEN 1 TABLET: 5; 325 TABLET ORAL at 22:06

## 2017-08-08 RX ADMIN — HYDROCODONE BITARTRATE AND ACETAMINOPHEN 1 TABLET: 5; 325 TABLET ORAL at 09:45

## 2017-08-08 NOTE — PROGRESS NOTES
Hospitalist Progress Note    Patient: Richard Marino MRN: [id number]  CSN: [id number]    YOB: 1960  Age: 59 y.o. Sex: male    DOA: 8/6/2017 LOS:  LOS: 2 days            Assessment/Plan   1. Hypercalcemia contineus to improve\  2. hypomagnasemia  3. Diffuse adenopathy possible lymphoma, sp supraclavicular biopsy  4. Prostate ca         Plan:  - awaiting pathology  - appreciate oncology input  - given electrolytes have normalized may be able to complete work up as out patient, defer this to Dr Vania August  - oxycodone for pain  - KCL po today  - continue flomax  - mobilize OOB      Patient Active Problem List   Diagnosis Code    Hypercalcemia of malignancy E83.52    Hypomagnesemia E83.42    Prostate cancer metastatic to multiple sites ([facility] [address] C61    Alcohol abuse F10.10    Essential hypertension I10    Metastatic adenocarcinoma to prostate ([facility] [address] C79.82    Dehydration E86.0    Protein-calorie malnutrition, moderate (HCC) E44.0    Cough R05               Subjective:    cc: hip pain  Pt continues to have hip pain, rates 6/10, some what better w hydrocodone  Moving bowels  Afebrile        REVIEW OF SYSTEMS:  General: No fevers or chills. Cardiovascular: No chest pain or pressure. No palpitations. Pulmonary: No shortness of breath. Gastrointestinal: No nausea, vomiting. Objective:        Vital signs/Intake and Output:  Visit Vitals    /87 (BP 1 Location: Right arm, BP Patient Position: At rest)    Pulse 96    Temp 98.8 °F (37.1 °C)    Resp 18    Ht 6' (1.829 m)    Wt 94.7 kg (208 lb 12.8 oz)    SpO2 98%    BMI 28.32 kg/m2     Current Shift:     Last three shifts:  08/06 0701 - 08/07 1900  In: 2094.2 [P.O.:360; I.V.:1734.2]  Out: -     Body mass index is 28.32 kg/(m^2).     Physical Exam:  GEN: Alert and oriented times three NAD  EYES: conjunctiva normal, lids with out lesions  HEENT: MMM, No thyromegaly, no lymphadenopathy  HEART: RRR +S1 +S2, no JVD, pulses 2+ distally  LUNGS: CTA B/L no wheezes, rales or rhonchi  ABDOMEN: + BS, soft NT/ND no organomegaly,  No rebound, + lymphadenopathy noted  EXTREMITIES: No edema cyanosis, cap refill normal   SKIN: no rashes or skin breakdown, no nodules, normal turgor  Current Facility-Administered Medications   Medication Dose Route Frequency    oxyCODONE IR (ROXICODONE) tablet 5 mg  5 mg Oral Q4H PRN    magnesium oxide (MAG-OX) tablet 400 mg  400 mg Oral DAILY    tamsulosin (FLOMAX) capsule 0.4 mg  0.4 mg Oral DAILY    0.9% sodium chloride infusion  125 mL/hr IntraVENous CONTINUOUS    LORazepam (ATIVAN) injection 1 mg  1 mg IntraVENous Q6H PRN    HYDROcodone-acetaminophen (NORCO) 5-325 mg per tablet 1 Tab  1 Tab Oral Q6H PRN    melatonin tablet 10 mg  10 mg Oral QHS    diphenhydrAMINE (BENADRYL) capsule 50 mg  50 mg Oral Q6H PRN         All the patient's labs over the past 24 hours were reviewed both during my initial daily workflow process and at the time notated as \"note time\" in [facility]. (It is not time stamped separately in this workflow.)  Select labs are listed below.         Labs: Results:       Chemistry Recent Labs      08/08/17   0042  08/07/17   0238 08/06/17   1710   GLU  98  92  86   NA  137  138  137   K  3.4*  3.6  3.4*   CL  104  104  100   CO2  23  25  24   BUN  12  13  12   CREA  1.38*  1.40*  1.42*   CA  10.4*  11.5*  11.5*  11.9*   AGAP  10  9  13   BUCR  9*  9*  8*   AP   --    --   116   TP   --    --   7.1   ALB   --    --   2.9*   GLOB   --    --   4.2*   AGRAT   --    --   0.7*      CBC w/Diff Recent Labs      08/07/17   0238  08/06/17   1710   WBC  5.8  7.4   RBC  3.91*  4.21*   HGB  12.1*  13.3   HCT  35.7*  38.4   PLT  185  215   GRANS   --   60   LYMPH   --   17*   EOS   --   3                      Liver Enzymes Recent Labs      08/06/17   1710   TP  7.1   ALB  2.9*   AP  116   SGOT  41*          Procedures/imaging: see electronic medical records for all procedures/Xrays and details which were not copied into this note but were reviewed prior to creation of [address], DO  Internal Medicine/Geriatrics

## 2017-08-08 NOTE — PROGRESS NOTES
IMPRESSION  1. Patient with a small focus of prostate cancer in the left apex only,  Khai 4+3.  2. Extensive lymphadenopathy involving the left supraclavicular and  also mediastinal nodes so far, unlikely related to prostate cancer. Differential noted below. 3. Hypercalcemia, due to direct bone involvement Left lilac. 4. Bone abnormality in the left iliac bone medially on bone scan. Large mass and bone erosion on CT. Cervical spine shows degenerative changes. 5. A 20-25 pound weight loss. No fever and no night sweats. 6. Extensive adenopathy in paraaortics and spleen, bone. Splenic lesions are large and typical for large cell lymphoma   7. 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     DIFFERENTIAL DIAGNOSES  At the current time mostly consistent of:  1. Non-Hodgkin lymphoma given the rapid growth and CT characteristics. Some of these are  associated with hypercalcemia.   The following are ordered wit the expectation that this proves to be Large cell lymphoma:    Plan:    MRI head in preparation for repeated LPs and intrathecal therapy given extranodal disease  Echocardiogram in anticipation of anthracyclines  HepBsAg in anticipation of Rituximab  HIV in anticipation of lymphoma diagnosis  Mediport in anticipation of need for chemotherapy  Would give 90 mg pamidronate  IV for bone disease and for hypercalcemia  OK to discharge after above to close office follow up

## 2017-08-08 NOTE — H&P
The patient is an appropriate candidate to undergo port placement. Patient assessed immediately prior to induction. Anesthesia plan as follows:   Conscious Sedation. Planned agent(s):  fentanyl and versed    ASA Score:  ASA 2 - Mild systemic disease    History and Physical update:  H&P was reviewed and the patient was examined. No changes have occurred in the patient's condition since the H&P was completed.     Shreya Maki MD  Vascular & Interventional Radiology  [facility]  8/8/2017

## 2017-08-08 NOTE — PROGRESS NOTES
Shift Summary- Pt had an uneventful shift. Biopsy site is tender touch but otherwise CDI with no s/s of bleeding. Pt medicated for pain one time.       Patient Vitals for the past 12 hrs:   Temp Pulse Resp BP SpO2   08/08/17 0326 98.8 °F (37.1 °C) 96 18 130/87 98 %   08/07/17 2226 98.6 °F (37 °C) (!) 111 16 139/81 100 %   08/07/17 1930 97.9 °F (36.6 °C) (!) 103 16 140/79 97 %

## 2017-08-08 NOTE — ROUTINE PROCESS
Bedside shift change report given to Troin Amor RN (oncoming nurse) by Ligia Samaniego RN (offgoing nurse). Report included the following information SBAR, Kardex, MAR, Recent Results and Alarm Parameters .

## 2017-08-08 NOTE — PROGRESS NOTES
TRANSFER - OUT REPORT:    Verbal report given to Antwan Sr RN(name) on Lexii Sadler  being transferred to (unit) for routine post - op       Report consisted of patients Situation, Background, Assessment and   Recommendations(SBAR). Information from the following report(s) SBAR, Kardex and MAR was reviewed with the receiving nurse. Lines:   Venous Access Device Mediport 08/08/17 Other (comment) (Active)   Central Line Being Utilized Yes 8/8/2017  1:58 PM   Criteria for Appropriate Use Irritant/vesicant medication 8/8/2017  1:58 PM   Site Assessment Clean, dry, & intact 8/8/2017  1:58 PM   Date of Last Dressing Change 08/08/17 8/8/2017  1:58 PM   Dressing Status New 8/8/2017  1:58 PM   Dressing Type Transparent 8/8/2017  1:58 PM       Peripheral IV 08/06/17 Left Arm (Active)   Site Assessment Clean, dry, & intact 8/7/2017  9:42 PM   Phlebitis Assessment 0 8/7/2017  9:42 PM   Infiltration Assessment 0 8/7/2017  9:42 PM   Dressing Status Clean, dry, & intact 8/7/2017  9:42 PM   Dressing Type Tape;Transparent 8/7/2017  9:42 PM   Hub Color/Line Status Blue; Infusing 8/7/2017  9:42 PM   Action Taken Open ports on tubing capped 8/7/2017  9:42 PM   Alcohol Cap Used Yes 8/7/2017  9:42 PM        Opportunity for questions and clarification was provided.       Patient transported with:   Registered Nurse  Tech

## 2017-08-08 NOTE — ROUTINE PROCESS
7293 assumed care of pt after bedside verbal report was given, pt resting in bed with daughter at bedside, normal saline infusing at 125 ml/hr, no distress noted, will monitor     1250 pt off unit to OR    1450 rcved pt from PACU, pt resting comfortably in bed, dressing to left chest dry and intact, no acute distress noted, will monitor     1640 pt sitting up on the side of the bed awake, family at bedside, no acute distress noted    2009 Bedside and Verbal shift change report given to [employer] (oncoming nurse) by ANTONIO Hezrog (offgoing nurse). Report included the following information SBAR, Kardex and MAR.

## 2017-08-08 NOTE — PROGRESS NOTES
Met with pt and family at bedside. Prior to admission pt was independent and does not have any DME. Anticipate pt will discharge home with no needs when medically stable. CM remains available to assist.      Care Management Interventions  PCP Verified by CM: Yes  Mode of Transport at Discharge:  Other (see comment) (Family)  Transition of Care Consult (CM Consult): Discharge Planning  Health Maintenance Reviewed: Yes  Current Support Network: Family Lives [place], Lives Alone  Confirm Follow Up Transport: Self  Plan discussed with Pt/Family/Caregiver: Yes  Discharge Location  Discharge Placement: Home

## 2017-08-08 NOTE — PROCEDURES
Vascular & Interventional Radiology Brief Procedure Note    Interventional Radiologist: Shaheen Daniels MD    Pre-operative Diagnosis:  Metastatic dz    Post-operative Diagnosis: Same as pre-op dx    Procedure(s) Performed:  Mediport     Anesthesia:  Local and Moderate Sedation    Findings:  Uncomplicated placement of R IJ/chest wall single lumen mediport.      Complications: None    Estimated Blood Loss:  minimal    Tubes and Drains: None    Specimens: None    Condition: Good       Shaheen Daniels MD  [facility]  Vascular & Interventional Radiology  8/8/2017

## 2017-08-09 ENCOUNTER — HOSPITAL ENCOUNTER (OUTPATIENT)
Dept: ULTRASOUND IMAGING | Age: 57
Discharge: HOME | End: 2017-08-09
Attending: INTERNAL MEDICINE
Payer: COMMERCIAL

## 2017-08-09 PROBLEM — C61 PROSTATE CANCER (HCC): Status: ACTIVE | Noted: 2017-08-09

## 2017-08-09 PROBLEM — C83.30 MALIGNANT LYMPHOMA, LARGE B-CELL, DIFFUSE (HCC): Status: ACTIVE | Noted: 2017-08-09

## 2017-08-09 LAB
ANION GAP BLD CALC-SCNC: 13 MMOL/L (ref 3–18)
BUN SERPL-MCNC: 10 MG/DL (ref 7–18)
BUN/CREAT SERPL: 8 (ref 12–20)
CALCIUM SERPL-MCNC: 9.6 MG/DL (ref 8.5–10.1)
CHLORIDE SERPL-SCNC: 103 MMOL/L (ref 100–108)
CO2 SERPL-SCNC: 21 MMOL/L (ref 21–32)
CREAT SERPL-MCNC: 1.31 MG/DL (ref 0.6–1.3)
GLUCOSE SERPL-MCNC: 98 MG/DL (ref 74–99)
HBV SURFACE AG SER QL: <0.1 INDEX
HBV SURFACE AG SER QL: NEGATIVE
HIV 1+2 AB+HIV1 P24 AG SERPL QL IA: NONREACTIVE
HIV12 RESULT COMMENT, HHIVC: NORMAL
MAGNESIUM SERPL-MCNC: 1.5 MG/DL (ref 1.6–2.6)
POTASSIUM SERPL-SCNC: 3.4 MMOL/L (ref 3.5–5.5)
SODIUM SERPL-SCNC: 137 MMOL/L (ref 136–145)

## 2017-08-09 PROCEDURE — 74011250637 HC RX REV CODE- 250/637: Performed by: INTERNAL MEDICINE

## 2017-08-09 PROCEDURE — 83735 ASSAY OF MAGNESIUM: CPT | Performed by: INTERNAL MEDICINE

## 2017-08-09 PROCEDURE — 88307 TISSUE EXAM BY PATHOLOGIST: CPT | Performed by: INTERNAL MEDICINE

## 2017-08-09 PROCEDURE — 88184 FLOWCYTOMETRY/ TC 1 MARKER: CPT | Performed by: INTERNAL MEDICINE

## 2017-08-09 PROCEDURE — 88374 M/PHMTRC ALYS ISHQUANT/SEMIQ: CPT | Performed by: INTERNAL MEDICINE

## 2017-08-09 PROCEDURE — 65270000029 HC RM PRIVATE

## 2017-08-09 PROCEDURE — 74011250636 HC RX REV CODE- 250/636: Performed by: INTERNAL MEDICINE

## 2017-08-09 PROCEDURE — 88342 IMHCHEM/IMCYTCHM 1ST ANTB: CPT | Performed by: INTERNAL MEDICINE

## 2017-08-09 PROCEDURE — 80048 BASIC METABOLIC PNL TOTAL CA: CPT | Performed by: INTERNAL MEDICINE

## 2017-08-09 PROCEDURE — 38505 NEEDLE BIOPSY LYMPH NODES: CPT

## 2017-08-09 PROCEDURE — 74011000250 HC RX REV CODE- 250: Performed by: INTERNAL MEDICINE

## 2017-08-09 PROCEDURE — 88334 PATH CONSLTJ SURG CYTO XM EA: CPT | Performed by: INTERNAL MEDICINE

## 2017-08-09 PROCEDURE — 74011250637 HC RX REV CODE- 250/637: Performed by: HOSPITALIST

## 2017-08-09 PROCEDURE — 88305 TISSUE EXAM BY PATHOLOGIST: CPT | Performed by: INTERNAL MEDICINE

## 2017-08-09 PROCEDURE — 77010033678 HC OXYGEN DAILY

## 2017-08-09 PROCEDURE — 88185 FLOWCYTOMETRY/TC ADD-ON: CPT | Performed by: INTERNAL MEDICINE

## 2017-08-09 PROCEDURE — 88239 TISSUE CULTURE TUMOR: CPT | Performed by: INTERNAL MEDICINE

## 2017-08-09 PROCEDURE — 88333 PATH CONSLTJ SURG CYTO XM 1: CPT | Performed by: INTERNAL MEDICINE

## 2017-08-09 PROCEDURE — 36415 COLL VENOUS BLD VENIPUNCTURE: CPT | Performed by: INTERNAL MEDICINE

## 2017-08-09 RX ORDER — OXYCODONE HYDROCHLORIDE 5 MG/1
5 TABLET ORAL
Qty: 30 TAB | Refills: 0 | Status: SHIPPED | OUTPATIENT
Start: 2017-08-09 | End: 2019-08-02

## 2017-08-09 RX ORDER — LIDOCAINE HYDROCHLORIDE 10 MG/ML
10 INJECTION, SOLUTION EPIDURAL; INFILTRATION; INTRACAUDAL; PERINEURAL ONCE
Status: COMPLETED | OUTPATIENT
Start: 2017-08-09 | End: 2017-08-09

## 2017-08-09 RX ORDER — ONDANSETRON 4 MG/1
4 TABLET, FILM COATED ORAL
Qty: 30 TAB | Refills: 1 | Status: SHIPPED | OUTPATIENT
Start: 2017-08-09 | End: 2019-08-02

## 2017-08-09 RX ORDER — MAGNESIUM SULFATE HEPTAHYDRATE 40 MG/ML
2 INJECTION, SOLUTION INTRAVENOUS ONCE
Status: COMPLETED | OUTPATIENT
Start: 2017-08-09 | End: 2017-08-09

## 2017-08-09 RX ORDER — LIDOCAINE HYDROCHLORIDE 10 MG/ML
INJECTION INFILTRATION; PERINEURAL
Status: DISPENSED
Start: 2017-08-09 | End: 2017-08-10

## 2017-08-09 RX ORDER — LANOLIN ALCOHOL/MO/W.PET/CERES
400 CREAM (GRAM) TOPICAL DAILY
Qty: 28 TAB | Refills: 0 | Status: SHIPPED | OUTPATIENT
Start: 2017-08-09 | End: 2019-08-02

## 2017-08-09 RX ORDER — TAMSULOSIN HYDROCHLORIDE 0.4 MG/1
0.4 CAPSULE ORAL
Qty: 1 CAP | Refills: 1 | Status: SHIPPED | OUTPATIENT
Start: 2017-08-09 | End: 2021-08-23

## 2017-08-09 RX ADMIN — HYDROCODONE BITARTRATE AND ACETAMINOPHEN 1 TABLET: 5; 325 TABLET ORAL at 05:00

## 2017-08-09 RX ADMIN — Medication 10 MG: at 21:47

## 2017-08-09 RX ADMIN — Medication 400 MG: at 10:05

## 2017-08-09 RX ADMIN — SODIUM CHLORIDE 125 ML/HR: 900 INJECTION, SOLUTION INTRAVENOUS at 04:08

## 2017-08-09 RX ADMIN — HYDROCODONE BITARTRATE AND ACETAMINOPHEN 1 TABLET: 5; 325 TABLET ORAL at 21:47

## 2017-08-09 RX ADMIN — MAGNESIUM SULFATE HEPTAHYDRATE 2 G: 40 INJECTION, SOLUTION INTRAVENOUS at 10:05

## 2017-08-09 RX ADMIN — LIDOCAINE HYDROCHLORIDE 10 ML: 10 INJECTION, SOLUTION EPIDURAL; INFILTRATION; INTRACAUDAL; PERINEURAL at 13:38

## 2017-08-09 RX ADMIN — SODIUM CHLORIDE 125 ML/HR: 900 INJECTION, SOLUTION INTRAVENOUS at 18:08

## 2017-08-09 RX ADMIN — TAMSULOSIN HYDROCHLORIDE 0.4 MG: 0.4 CAPSULE ORAL at 10:05

## 2017-08-09 NOTE — ROUTINE PROCESS
Bedside and Verbal shift change report given to Morris Montgomery RN (oncoming nurse) by Segundo Robles RN (offgoing nurse). Report included the following information SBAR and Kardex.

## 2017-08-09 NOTE — DISCHARGE SUMMARY
[facility] SUMMARY    Name:  Reinier Chopra  MR#:  [id number]  :  1960  Account #:  [de-identified]  Date of Adm:  2017  Date of Discharge:      DISCHARGE DIAGNOSES:  1. Lymphoma. 2. Prostate cancer. 3. Hypercalcemia. 4. Hypomagnesemia. 5. Hypertension. HISTORY OF PRESENT ILLNESS: This gentleman is a patient that I  follow in the office. Earlier this year, he was noted to have mild  elevation in PSA. He was referred to Urology. Ultimately, he ended up  going to [place] for an MRI-guided biopsy of the prostate. One of  12 samples returned with cancer. His PSA's have been between 5 and  6. Recently he noted decreased energy. Also, a cough. Medication  was tried for the cough. A chest x-ray was completed that was normal.  Antihistamines were tried. An ACE inhibitor was discontinued. These  things did not help. Laboratories were drawn last week. His  laboratories came back with hypercalcemia that was in excess of 13. He also had low magnesium. His CBC demonstrated a normal white  blood cell count 6.8, hemoglobin 13.9, platelets 428. Given the  metabolic abnormalities, he was admitted to the hospital for evaluation  and treatment. During this time, an enlarged lymph node in the left  supraclavicular area became apparent. There was concern for the  possibility of a malignancy other than prostate cancer. I ordered a  biopsy that was accomplished by Radiology. This was completed on  2017. Oncology was consulted. The patient was seen in  consultation by Dr. Carissa Levin. Dr. Davon Robert thought that this  may involve a lymphoma in addition to the prostate cancer. The biopsy  has returned at this time. It demonstrated what appears to be a B-cell  lymphoma. The full report is on the [address]. A port was  placed yesterday by Interventional Radiology. The patient tolerated this  very well. A CT of the chest was completed on 2017.  This  showed left supraclavicular and multistational mediastinal adenopathy  concerning for metastatic disease. A CT of the abdomen and pelvis  was accomplished. It showed splenomegaly with numerous  hypoattenuating splenic lesions, multistation abdominal adenopathy  and an abnormal appearing left iliac bone with associated soft tissue  mass lesion in the left gluteus sharlene adjacent subcutaneous  tissues. Imaging findings are most suggestive of lymphomatous  process, possibly of diffuse large B-cell lymphoma and less  likely metastatic disease. There was a small left pleural effusion. An  MRI of the brain was accomplished. This showed mild atrophy and  chronic small vessel changes. Otherwise, it was unremarkable. An  echocardiogram was completed on 08/08/2017. This demonstrated an  ejection fraction of 55% to 60%. There were no regional wall motion  abnormalities. There was grade 1 diastolic dysfunction. Last night Mr. Darius Guadarrama was seen by Dr. Jonny Villa. More testing of biopsied tissue  was desired. Pathology had used all material obtained on his supraclavicular   LN biopsy. Another biopsy was obtained yesterday by Interventional Radiology. Tissues was sent to the lab. Patient seen by Dr Jonny Villa this morning. He recommends   initiating inpatient chemotherapy. [facility] is unable to do this at this time. Dr. Jonny Villa has requested that Mr. Darius Guadarrama be transferred to his care at the [facility] in [place], [place]. Currently efforts are being made to set up this transition. DISCHARGE MEDICATIONS:  1. Flomax 0.4 mg p.o. every night. 2. Magnesium oxide 2 tablets daily. 3. Tessalon 100 mg 3 times daily. 4. Zofran 4 mg tablet every 6 hours as needed for nausea. 5. Norco 5/325 mg p.o. every 6 hours p.r.n. for pain. DISCHARGE INSTRUCTIONS:    1. Transfer to Dr. Jonny Villa at [facility] to initiate chemotherapy for this lymphoma.       TIME SPENT: 55 minutes have been spent reviewing the chart and  coordinating care.        Daryn Chaudhary M.D.    Dung Jade / Yanick Recinos  D:  08/09/2017   10:15  T:  08/09/2017   16:50  Job #:  [id number]

## 2017-08-09 NOTE — ROUTINE PROCESS
Bedside and Verbal shift change report given to Rell Pablo RN (oncoming nurse) by Jerica Tenorio RN (offgoing nurse). Report included the following information SBAR and Kardex.

## 2017-08-09 NOTE — PROGRESS NOTES
Left gluteal lymph node core biopsy performed by Dr Chad Mahan, pathologist in the room to reviews samples.  Patient tolerated procedure well and left department in stable condition

## 2017-08-09 NOTE — PROGRESS NOTES
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 [address] (8/9/2017)        Plan:     · His calcium is better post pamidronate and IVF  · I discussed with pathology, no enough material for molecular studies, repeat biopsy done today 8/9/2017 for studies, particularly [address] [place] [address]  · Will order LP for tomorrow given high grade nature; he will likely need intra thecal chemo  · He has had port placed and Echo  · Given high grade nature and rapid growth, we will start chemo as soon as LP is done. I will give R-EPOCH which covers high grade, double hit lymphoma well. Indication and potential side effects were discussed and he agreed to proceed. · Will start tumor lysis prophylaxis   · Given high grade nature, high risk of tumor lysis, I recommend to start first chemo in house and monitor for toxicity and tumor lysis closely  · I had discussed the case with Dr. Evaristo Oviedo, Pathologist, and primary team Dr. Meghan Webb  · I have discussed with inpatient pharmacist about chemo plan  · Total time spent in patient care 70 minutes      Subjectives:     Anxious. Had biopsy done.      Objectives:      VITAL SIGNS: Patient Vitals for the past 24 hrs:   BP Temp Pulse Resp SpO2 Weight   08/09/17 1500 146/88 98.3 °F (36.8 °C) 76 14 98 % -   08/09/17 1047 (!) 141/91 98.3 °F (36.8 °C) 88 14 98 % -   08/09/17 0817 139/89 98 °F (36.7 °C) 88 14 99 % -   08/09/17 0352 118/83 99.7 °F (37.6 °C) 95 16 100 % -   08/08/17 2327 127/71 98.6 °F (37 °C) 99 16 100 % 94.3 kg (208 lb)     GENERAL: normal appearance, no acute distress. HEENT: conjunctivae normal, eyelids normal, PERRLA, anicteric, external ears and nose normal, hearing grossly normal.   NECK: supple, no masses. LUNG: breathing comfortably, lungs clear to auscultation and percussion. CARDIOVASCULAR: normal heart sounds, heart rhythm regular, no peripheral edema. ABDOMEN: soft. bowel sounds normal, non-tender non distension, no hepatosplenomegaly   PELVIS: pelvic exam deferred. RECTUM: rectal exam deferred. LYMPH NODES: extensive adenopathy. Port in place. SKIN: no rash, no petechiae, no ecchymosis, no pallor, no cutaneous nodules. MUSCULOSKELETAL: normal muscle strength. NEUROLOGIC: no focal motor deficit, normal gait, no abnormal mental status. PSYCHIATRIC: oriented to person, time and place, mood and affect appropriate.       Medications:      Current Medications:    Current Facility-Administered Medications   Medication Dose Route Frequency    lidocaine (XYLOCAINE) 10 mg/mL (1 %) injection        oxyCODONE IR (ROXICODONE) tablet 5 mg  5 mg Oral Q4H PRN    fentaNYL citrate (PF) injection  mcg   mcg IntraVENous Rad Multiple    midazolam (VERSED) injection 1-4 mg  1-4 mg IntraVENous Multiple    naloxone (NARCAN) injection 0.2 mg  0.2 mg IntraVENous EVERY 2 MINUTES AS NEEDED    flumazenil (ROMAZICON) 0.1 mg/mL injection 0.2 mg  0.2 mg IntraVENous PRN    0.9% sodium chloride infusion  25 mL/hr IntraVENous CONTINUOUS    heparin (porcine) 100 unit/mL injection 300-500 Units  300-500 Units InterCATHeter PRN    magnesium oxide (MAG-OX) tablet 400 mg  400 mg Oral DAILY    tamsulosin (FLOMAX) capsule 0.4 mg  0.4 mg Oral DAILY    0.9% sodium chloride infusion  125 mL/hr IntraVENous CONTINUOUS    LORazepam (ATIVAN) injection 1 mg  1 mg IntraVENous Q6H PRN    HYDROcodone-acetaminophen (NORCO) 5-325 mg per tablet 1 Tab  1 Tab Oral Q6H PRN    melatonin tablet 10 mg  10 mg Oral QHS    diphenhydrAMINE (BENADRYL) capsule 50 mg  50 mg Oral Q6H PRN       Allergies:    No Known Allergies      Ancillary Study Results:      Laboratory:    Recent Results (from the past 24 hour(s))   MAGNESIUM    Collection Time: 08/09/17  2:33 AM   Result Value Ref Range    Magnesium 1.5 (L) 1.6 - 2.6 mg/dL   METABOLIC PANEL, BASIC    Collection Time: 08/09/17  2:33 AM   Result Value Ref Range    Sodium 137 136 - 145 mmol/L    Potassium 3.4 (L) 3.5 - 5.5 mmol/L    Chloride 103 100 - 108 mmol/L    CO2 21 21 - 32 mmol/L    Anion gap 13 3.0 - 18 mmol/L    Glucose 98 74 - 99 mg/dL    BUN 10 7.0 - 18 MG/DL    Creatinine 1.31 (H) 0.6 - 1.3 MG/DL    BUN/Creatinine ratio 8 (L) 12 - 20      GFR est AA >60 >60 ml/min/1.73m2    GFR est non-AA 56 (L) >60 ml/min/1.73m2    Calcium 9.6 8.5 - 10.1 MG/DL         Radiology:   Nm Bone Scan Wh Body    Result Date: 8/1/2017  WHOLE BODY BONE SCAN: Indication: Prostate cancer. Technique: Anterior and posterior images of the whole body were performed after the intravenous administration of 63.3 millicuries of technetium 99m MDP. Injection site:  Left antecubital fossa. Prior bone scans: None Correlative anatomic imaging:  None. Findings: Moderate focus of uptake involving the left side of the mid cervical spine. Asymmetric uptake involving the medial aspect of the left ilium. Mild uptake involving both shoulders, both hips both knees, both ankles/feet, although nonspecific, typically degenerative in etiology. Bilateral renal uptake is present. IMPRESSION: 1. Asymmetric uptake involving the medial aspect of the left iliac bone. Findings concerning for osseous metastatic disease. Suggest anatomic correlation especially with any previous outside CTs.  2. Moderate focus of uptake involving left side of the mid cervical spine, although nonspecific, may be degenerative in etiology. Xr Chest Pa Lat    Result Date: 8/2/2017  EXAM: Chest, PA and lateral INDICATION: Cough COMPARISON: None. _______________ FINDINGS: Cardiac silhouette and pulmonary vessels are normal. Thoracic aorta is tortuous. Focal streaky markings are present in the left lower lobe. The right lung is clear. The costophrenic angles are sharp. No pneumothorax. _______________     IMPRESSION: Left lower lobe atelectasis, cannot entirely exclude streaky infiltrate. Xr Hip Lt W Or Wo Pelv 2-3 Vws    Result Date: 8/7/2017  Left hip: INDICATION: Abnormal bone scan. AP pelvis, 2 films and frog-leg lateral view of the left hip. Correlation with bone scan 07/31/2017. There is subtle increased density/sclerosis involving the medial left ilium, adjacent to the sacroiliac joint. No fracture. Mild bilateral hip arthropathy in lower lumbar spondylosis. IMPRESSION: 1. Subtle increase density/sclerosis involving the medial aspect of left ilium, although nonspecific, concerning for osseous metastatic disease. Ir Insert Odalis Flatter Port Over 5 Years    Result Date: 8/8/2017  PROCEDURE: RIGHT CHEST WALL MEDIPORT PLACEMENT INDICATION: Requires central venous access for chemotherapy GUIDANCE: Ultrasound and fluoroscopic guidance was used to position (and confirm the position of) the needle / catheter. Image(s) saved in PACS: Ultrasound and fluoroscopic TECHNIQUE: Informed consent was obtained. Procedural time out was performed. The procedure was performed using standard aseptic technique. Maximal sterile barrier technique was used for the procedure including sterile cap, sterile mask, sterile gown, sterile gloves and a large sterile sheet. Proper hand hygiene along with proper skin antiseptic was followed, utilizing 2% chlorhexidine solution for skin preparation. With respect to ultrasound guidance, sterile gel and sterile probe covers were used.  Ultrasound evaluation of potential access sites was performed. After successfully identifying a patent vessel a permanent recording was created for the patient record. The right internal jugular vein was punctured under direct ultrasound guidance and a guidewire was passed into the right atrium. Was exchange for a micropuncture set. A 0.035 guidewire was passed into the IVC. The Mediport kit supplied peel-away sheath was then placed, with its tip in right atrium. Next after local anesthetic a skin incision was made in the right chest wall and local and blunt dissection was performed. The subcutaneous tract was created with a tunneling device. Catheter was attached to the [address] [facility]. The Mediport was placed in the pocket. Catheter was then measured on the chest wall and trimmed to the appropriate length, the catheter was then inserted through the peel-away sheath. The port was tested and it easily aspirates and flushes and there was no leakage. The lumen was filled with Hep-Lock solution. 3 deep sutures were placed using 3-0 Vicryl absorbable suture to close the pocket. The skin incisions on the right chest wall and right neck were then approximated with a running for 4-0 Vicryl subcuticular suture and the Dermabond adhesive and a sterile dressing were applied. He tolerated the procedure very well, without complications. Placement films show the tip in the SVC/RA junction, in good position with no kinking along the course of the catheter or at its connection to the mediport. CONSCIOUS SEDATION: Provided by radiology nursing with versed 1 mg IV and fentanyl 50ugm IV with continuous monitoring of vital signs. Start time 1340 hours / End time 1408 hours. 2 g IV Ancef given as preprocedure antibiotic prophylaxis. Fluoroscopic time: 0.3 minutes Fluoroscopic dose (reference air kerma): 6 mGy     IMPRESSION: Successful right chest wall/IJ single lumen Mediport placement. Mediport ready for use.      Mri Brain W Wo Cont    Result Date: 8/8/2017  EXAM: MRI brain without and with contrast 8/8/2017 INDICATION: Extranodal non-Hodgkin's lymphoma. Remote history of testicular cancer. COMPARISON: None. TECHNIQUE: Multiplanar multisequential images of the brain were obtained before and after the administration of 7.5 cc of IV Gadovist. _______________ FINDINGS: BRAIN AND POSTERIOR FOSSA: Mild atrophy and chronic small vessel changes are present. . There is no intracranial hemorrhage, mass effect, or midline shift. There are no significant additional areas of abnormal parenchymal signal. There are no areas of restricted diffusion to suggest acute infarct. There is no abnormal cerebral or cerebellar enhancement. EXTRA-AXIAL SPACES AND MENINGES: There are no abnormal extra-axial fluid collections. There is no abnormal meningeal enhancement. VASCULAR: The visualized portions of the intracranial carotid and vertebrobasilar systems demonstrate patent appearing flow voids. CALVARIA: Normal. SINUSES: Mild mucosal thickening is noted throughout the paranasal sinuses. No air-fluid levels are present. The visualized portions of the mastoid air cells are clear. Raul Saunas CRANIOCERVICAL JUNCTION: Normal. OTHER: None. _______________     IMPRESSION: 1. Mild atrophy and chronic small vessel changes. 2.  Otherwise, unremarkable evaluation. Specifically, no acute intracranial abnormalities are identified and there is no abnormal intracranial enhancement. Cta Chest W Or W Wo Cont    Result Date: 8/2/2017  EXAM: CTA Chest INDICATION: 53-year-old patient with history of prostate cancer and persistent cough. COMPARISON: No prior study. TECHNIQUE: Axial CT imaging from the thoracic inlet through the diaphragm with intravenous contrast utilizing CTA study for pulmonary artery evaluation. Coronal and sagittal MIP reformations were generated at a separate workstation.  One or more dose reduction techniques were used on this CT: automated exposure control, adjustment of the mAs and/or kVp according to patient's size, and iterative reconstruction techniques. The specific techniques utilized on this CT exam have been documented in the patient's electronic medical record. _______________ FINDINGS: EXAM QUALITY: Overall exam quality is adequate for exclusion of large, central pulmonary embolus. Secondary to patient coughing, the examination is otherwise considerably limited. PULMONARY ARTERIES: There is no large, central pulmonary embolism present. The main pulmonary arterial size is within normal limits. MEDIASTINUM: The cardiac size is within normal limits. Thoracic aorta is normal in course and caliber. Scattered atherosclerotic calcifications of the aortic valvular apparatus as well as the coronary arteries noted. No pericardial effusion. LYMPH NODES: Supraclavicular and multistation mediastinal adenopathy noted. Largest supraclavicular lymph node demonstrated at slice position 4, on the left, measuring approximately 3.2 x 3.1 cm in size. Largest mediastinal lymph node conglomerate appears to be at the subcarinal maxine station, where a maxine mass measures approximately 2.9 x 3.5 cm in size. No axillary or hilar adenopathy. AIRWAY: Central airways are patent. LUNGS: Evaluation of the lung bases in particular is considerably limited by artifact, with dependent atelectatic opacities appreciated. No discrete confluent pneumonic consolidation. PLEURA: No pneumothorax or pleural effusion. UPPER ABDOMEN: Small hiatal hernia. Upper abdominal structures limited by motion artifact. . OTHER: No acute or aggressive osseous abnormalities identified within the limitations of motion. _______________     IMPRESSION: 1. Technically limited examination as above secondary to persistent coughing. 2. No evidence of large, central pulmonary embolism.  3. Left supraclavicular and multistation mediastinal adenopathy most concerning for underlying malignancy, with differential considerations including a manifestation of metastatic disease versus lymphoproliferative process such as lymphoma. 4. Small hiatal hernia. Technical limitations and findings of this examination discussed with Aziza Pena PA-C in the emergency room at the time of the examination. Ct Abd Pelv W Cont    Result Date: 8/7/2017  EXAM: CT of the abdomen and pelvis INDICATION: 59 old patient with lymphadenopathy. Recent supraclavicular maxine biopsy. COMPARISON: CT of the chest 8/2/2017. No prior cross-sectional imaging of the abdomen or pelvis is available for review. TECHNIQUE: Axial CT imaging of the abdomen and pelvis was performed with intravenous contrast. Multiplanar reformats were generated. One or more dose reduction techniques were used on this CT: automated exposure control, adjustment of the mAs and/or kVp according to patient's size, and iterative reconstruction techniques. The specific techniques utilized on this CT exam have been documented in the patient's electronic medical record. _______________ FINDINGS: LOWER CHEST: Images at the lung bases are degraded by motion artifact. The cardiac size is normal. No pericardial effusion. Inferior mediastinal adenopathy demonstrated, measuring approximately 4.6 x 3.0 cm in size. LIVER, BILIARY: Hepatic parenchymal enhancement is uniform. No focal hepatic lesion. No biliary dilation. Gallbladder is unremarkable. PANCREAS: Normal. SPLEEN: The spleen is enlarged, measuring approximate 16 cm in oblique sagittal diameter. There are numerous family hypoattenuating splenic masses, the largest present in the anterior lower pole measuring approximately 4.3 x 5.1 cm in size. ADRENALS: Normal. KIDNEYS/URETERS/BLADDER: Renal parenchymal enhancement is symmetric. There is no hydronephrosis. There is no nephrolithiasis. No distal ureteral or bladder stone. Bladder is decompressed, with a mildly thick-walled appearance. PELVIC ORGANS: Unremarkable.  VASCULATURE: Aortobiiliac atherosclerotic calcification is present without evidence of aneurysmal dilatation. LYMPH NODES: There is extensive retrocrural adenopathy as well as periaortic and retroperitoneal adenopathy, which elevates the infrarenal abdominal aorta. Large maxine conglomerate at the level of the lower pole left kidney measures approximately 6.0 x 3.6 cm in size. The iliac maxine chains appear within normal limits, as do the obturator chains. GASTROINTESTINAL TRACT: There is a small hiatal hernia. The stomach, small intestine, and large intestine are otherwise normal in course and caliber. No bowel obstruction or perforation. BONES: There is a permeative and diffusely abnormal appearance of the posterior aspect of the left iliac bone, with associated soft tissue which expands the left sacroiliac joint posteriorly, and infiltrates into the medial aspect of the left gluteus sharlene and inferior left erector spinae. Although difficult to measure given the relative isoattenuation to skeletal muscle, the soft tissue abnormality dislocation measures approximately 4.0 x 7.1 cm in size, with an additional soft tissue component extending superiorly from the posterior left iliac (image 93) which measures approximately 4.3 x 2.9 cm in size. _______________     IMPRESSION: 1. Splenomegaly with numerous hypoattenuating splenic lesions; multistation abdominal adenopathy which elevates the aorta; as well as an permeative abnormal appearance of the left iliac bone with associated soft tissue mass lesion in the left gluteus sharlene and adjacent subcutaneous tissues -- imaging findings are most suggestive of a lymphomatous process, possibly a diffuse large B-cell lymphoma and less likely a manifestation of metastatic disease. Correlation with same-day biopsy results is recommended.  The findings of bone scintigraphy performed for the purposes of prostate carcinoma staging (8/1/2017) are likely explained by the osseous and soft tissue abnormality involving the left iliac bone as above and are atypical for appearance of carcinoma. 2. Small left pleural effusion. 3. Small hiatal hernia. 4. Normal caliber small and large intestine. No bowel obstruction. Us Guide Bx Lymph Nod Super    Result Date: 8/9/2017  CT guided core biopsy of left iliac/gluteal mass Indication: Left supraclavicular lymph node biopsy on 08/07/2017 demonstrating B-cell lymphoma, additional tissue needed for molecular studies. Correlation: CT abdomen and pelvis 08/07/2017. Procedure was discussed with Dr. Bacilio Odonnell and acceptable to biopsy left gluteal/iliac mass. Technique/findings: After the procedure, as well as its risks, benefits and alternatives were explained to the patient, and all questions answered, written informed consent was obtained and witnessed. The procedure was performed under local sedation. Sterile ultrasound gel and probe cover were used. Patient was placed prone and left gluteal/iliac mass was localized with ultrasound. The overlying skin was marked, prepped and draped in sterile fashion and lidocaine was used for local anesthesia. Using a [employer] coaxial system, a 17 gauge introducer needle was advanced into the mass under ultrasound guidance. A 18-gauge core was obtained and touch prep demonstrating positive sampling for malignancy. As per Dr. Liu Port request,  4-18 gauge good quality core biopsies were obtained for formalin. Additional 4 good quality cores were obtained for flow cytometry and placed directly in RPMI. The specimen was deemed adequate and satisfactory by the pathologist.  Needle was removed. Sterile dressing applied. The patient tolerated the procedure well and there were no immediate complications. GUIDANCE: Ultrasound guidance was  used to position (and confirm the position of) the needle. Image(s) saved in PACS: Ultrasound. IMPRESSION: Successful US guided core biopsy from left gluteal/iliac mass as detailed above. Specimen deemed adequate and satisfactory by pathologist, Dr. Francia Poon.     Us Guide Bx Lymph Nod Super    Result Date: 8/7/2017  ULTRASOUND GUIDED FINE NEEDLE ASPIRATION AND CORE BIOPSY OF LEFT SUPRACLAVICULAR LYMPH NODES: Indication: History of prostate cancer. Rapidly enlarging left supraclavicular lymph nodes. Technique/findings: After the procedure, as well as its risks, benefits and alternatives were explained to the patient, and all questions answered, written informed consent was obtained and witnessed. The procedure was performed under conscious sedation with constant monitoring. The lymph nodes in the left supraclavicular region was localized under ultrasound guidance. The skin was marked, prepped and draped in sterile fashion and bicarbonate buffered lidocaine was used for local anesthesia. Initially, 25-gauge fine-needle aspiration was performed. Total 3 passes were performed. Initial interpretation of the sample demonstrated epithelial lesion per Dr. Gemini Nieto (pathology). Using a [employer] coaxial system, a 19 gauge introducer needle was advanced into adjacent larger lymph node. A total of 5-20 gauge core biopsies were obtained in placed directly into the specimen cup. Specimen deemed adequate. Standard postprocedure pause. The patient tolerated the procedure well and there were no immediate complications. GUIDANCE: Ultrasound guidance was used to position (and confirm the position of) the needle. Image(s) saved in PACS: Ultrasound Preprocedure timeout: 1100 hours. IMPRESSION: Successful ultrasound guided fine needle aspiration and core biopsy of left supraclavicular lymph nodes. Initial interpretation demonstrated epithelial lesion. Lesvia Cabrera.  Manuela Parkinson MD, PhD, [place]  Phone: [phone]  Pager: [phone]

## 2017-08-09 NOTE — PROGRESS NOTES
Hospitalist Progress Note    Patient: Cyrus Kraft MRN: [id number]  CSN: [id number]    YOB: 1960  Age: 59 y.o. Sex: male    DOA: 8/6/2017 LOS:  LOS: 3 days          Chief Complaint:nausea          Assessment/Plan       Patient Active Problem List   Diagnosis Code    Hypercalcemia of malignancy E83.52    Hypomagnesemia E83.42    Alcohol abuse F10.10    Essential hypertension I10    Dehydration E86.0    Protein-calorie malnutrition, moderate (HCC) E44.0    Cough R05    Malignant lymphoma, large B-cell, diffuse (HCC) C83.30    Prostate cancer ([facility] [address] Rubens Celis         Was slated to go home. Spoke with covering oncologist Mine Camacho. Requests another LN biopsy. Will decide on starting chemo here vs outpatient. Spoke with Mr. Hubert Harris and updated. Agrees with plan. Subjective:    Seen and examined. Path from LN Bx with what looks like lymphoma. Was poised for DC. Rec'd call from Dr. Farooq Cox. Requests that we hold off on dc and obtain more tissue for analysis. Review of systems:    Constitutional: denies fevers, chills, myalgias  Respiratory: cough  Cardiovascular: denies chest pain, palpitations  Gastrointestinal: + nausea      Vital signs/Intake and Output:  Visit Vitals    /89 (BP 1 Location: Right arm, BP Patient Position: At rest)    Pulse 88    Temp 98 °F (36.7 °C)    Resp 14    Ht 6' (1.829 m)    Wt 94.3 kg (208 lb)    SpO2 99%    BMI 28.21 kg/m2     Current Shift:  08/09 0701 - 08/09 1900  In: 240 [P.O.:240]  Out: -   Last three shifts:  08/07 1901 - 08/09 0700  In: 1016.6 [P.O.:240; I.V.:776.6]  Out: -     Exam:    General: NAD, sittng on side of bed,. Head/Neckmmm, supraclavicular LN notable. CVS:s1s2  Lungs:cta  Abdomen: Soft, bs+  Extremities: No edema.                 Labs: Results:       Chemistry Recent Labs      08/09/17   0233  08/08/17   0042  08/07/17   0238  08/06/17   1710   GLU  98  98  92  86   NA  137  137  138  137   K  3.4*  3.4*  3.6  3.4*   CL 103  104  104  100   CO2  21  23  25  24   BUN  10  12  13  12   CREA  1.31*  1.38*  1.40*  1.42*   CA  9.6  10.4*  11.5*  11.5*  11.9*   AGAP  13  10  9  13   BUCR  8*  9*  9*  8*   AP   --    --    --   116   TP   --    --    --   7.1   ALB   --    --    --   2.9*   GLOB   --    --    --   4.2*   AGRAT   --    --    --   0.7*      CBC w/Diff Recent Labs      08/07/17   0238  08/06/17   1710   WBC  5.8  7.4   RBC  3.91*  4.21*   HGB  12.1*  13.3   HCT  35.7*  38.4   PLT  185  215   GRANS   --   60   LYMPH   --   17*   EOS   --   3      Cardiac Enzymes No results for input(s): CPK, CKND1, JUAN in the last 72 hours. No lab exists for component: CKRMB, TROIP   Coagulation Recent Labs      08/08/17   0850   PTP  14.4   INR  1.2   APTT  34.8       Lipid Panel No results found for: CHOL, CHOLPOCT, CHOLX, CHLST, CHOLV, 005314, HDL, LDL, LDLC, DLDLP, 331228, VLDLC, VLDL, TGLX, TRIGL, TRIGP, TGLPOCT, CHHD, CHHDX   BNP No results for input(s): BNPP in the last 72 hours.    Liver Enzymes Recent Labs      08/06/17   1710   TP  7.1   ALB  2.9*   AP  116   SGOT  41*      Thyroid Studies No results found for: T4, T3U, TSH, TSHEXT     Procedures/imaging: see electronic medical records for all procedures/Xrays and details which were not copied into this note but were reviewed prior to creation of Mihaela Thurston MD

## 2017-08-09 NOTE — ROUTINE PROCESS
Bedside shift change report given to Ksenia Barrett RN (oncoming nurse) by Kasia Hebert   (offgoing nurse). Report included the following information SBAR, Kardex and MAR.

## 2017-08-09 NOTE — PROGRESS NOTES
conducted a Follow up consultation and Spiritual Assessment for Leon Dill, who is a 59 y.o.,male. Continued the relationship of care and support. Listened empathically. Offered prayer and assurance of continued prayer on patients behalf. Plan:  Chaplains will continue to follow and will provide pastoral care as needed or requested.  recommends bedside caregivers page the  on duty if patient shows signs of acute spiritual or emotional distress. Father NANI Grier.Div.   [facility] - Office

## 2017-08-09 NOTE — PROGRESS NOTES
0057-Resting quietly in bed. Arouses easily. Stable. IVF infusing. Band-aid noted to left side of neck, intact, no drainage; small lump noted. Dressing, right chest, no drainage; implanted port placement. Denies need for pain medication at this time. Call light and personal items within reach. Assessment complete. 0455-No change from initial assessment. Stable. 0606-Reassessed pain; 2/10. Shift Summary:  Uneventful shift. Stable. Daughter at bedside at change of shift.

## 2017-08-10 ENCOUNTER — APPOINTMENT (OUTPATIENT)
Dept: GENERAL RADIOLOGY | Age: 57
DRG: 824 | End: 2017-08-10
Attending: INTERNAL MEDICINE
Payer: COMMERCIAL

## 2017-08-10 VITALS
RESPIRATION RATE: 19 BRPM | BODY MASS INDEX: 29.08 KG/M2 | HEART RATE: 98 BPM | SYSTOLIC BLOOD PRESSURE: 126 MMHG | WEIGHT: 214.73 LBS | TEMPERATURE: 98.1 F | HEIGHT: 72 IN | DIASTOLIC BLOOD PRESSURE: 73 MMHG | OXYGEN SATURATION: 99 %

## 2017-08-10 LAB
ANION GAP BLD CALC-SCNC: 10 MMOL/L (ref 3–18)
ATRIAL RATE: 92 BPM
BUN SERPL-MCNC: 7 MG/DL (ref 7–18)
BUN/CREAT SERPL: 6 (ref 12–20)
CALCIUM SERPL-MCNC: 9.1 MG/DL (ref 8.5–10.1)
CALCULATED P AXIS, ECG09: 41 DEGREES
CALCULATED R AXIS, ECG10: 9 DEGREES
CALCULATED T AXIS, ECG11: 28 DEGREES
CHARACTER SMN: CLEAR
CHLORIDE SERPL-SCNC: 102 MMOL/L (ref 100–108)
CO2 SERPL-SCNC: 22 MMOL/L (ref 21–32)
COLOR SPUN CSF: COLORLESS
COLOR SPUN CSF: COLORLESS
CREAT SERPL-MCNC: 1.2 MG/DL (ref 0.6–1.3)
DIAGNOSIS, 93000: NORMAL
GLUCOSE CSF-MCNC: 55 MG/DL (ref 40–70)
GLUCOSE SERPL-MCNC: 91 MG/DL (ref 74–99)
MAGNESIUM SERPL-MCNC: 1.5 MG/DL (ref 1.6–2.6)
P-R INTERVAL, ECG05: 182 MS
POTASSIUM SERPL-SCNC: 3.1 MMOL/L (ref 3.5–5.5)
PROT CSF-MCNC: 41 MG/DL (ref 15–45)
Q-T INTERVAL, ECG07: 340 MS
QRS DURATION, ECG06: 90 MS
QTC CALCULATION (BEZET), ECG08: 420 MS
RBC # CSF: 0 /CU MM
SODIUM SERPL-SCNC: 134 MMOL/L (ref 136–145)
TUBE # CSF: 2
VENTRICULAR RATE, ECG03: 92 BPM
WBC # CSF: 0 /CU MM

## 2017-08-10 PROCEDURE — 83735 ASSAY OF MAGNESIUM: CPT | Performed by: INTERNAL MEDICINE

## 2017-08-10 PROCEDURE — 84157 ASSAY OF PROTEIN OTHER: CPT | Performed by: INTERNAL MEDICINE

## 2017-08-10 PROCEDURE — 36415 COLL VENOUS BLD VENIPUNCTURE: CPT | Performed by: INTERNAL MEDICINE

## 2017-08-10 PROCEDURE — 88108 CYTOPATH CONCENTRATE TECH: CPT | Performed by: INTERNAL MEDICINE

## 2017-08-10 PROCEDURE — 009U3ZX DRAINAGE OF SPINAL CANAL, PERCUTANEOUS APPROACH, DIAGNOSTIC: ICD-10-PCS | Performed by: RADIOLOGY

## 2017-08-10 PROCEDURE — 82945 GLUCOSE OTHER FLUID: CPT | Performed by: INTERNAL MEDICINE

## 2017-08-10 PROCEDURE — 89050 BODY FLUID CELL COUNT: CPT | Performed by: INTERNAL MEDICINE

## 2017-08-10 PROCEDURE — 74011000250 HC RX REV CODE- 250: Performed by: INTERNAL MEDICINE

## 2017-08-10 PROCEDURE — 74011250637 HC RX REV CODE- 250/637: Performed by: INTERNAL MEDICINE

## 2017-08-10 PROCEDURE — 77003 FLUOROGUIDE FOR SPINE INJECT: CPT

## 2017-08-10 PROCEDURE — 74011250636 HC RX REV CODE- 250/636: Performed by: INTERNAL MEDICINE

## 2017-08-10 PROCEDURE — 80048 BASIC METABOLIC PNL TOTAL CA: CPT | Performed by: INTERNAL MEDICINE

## 2017-08-10 PROCEDURE — 77010033678 HC OXYGEN DAILY

## 2017-08-10 RX ORDER — LIDOCAINE HYDROCHLORIDE 10 MG/ML
1-5 INJECTION, SOLUTION EPIDURAL; INFILTRATION; INTRACAUDAL; PERINEURAL
Status: COMPLETED | OUTPATIENT
Start: 2017-08-10 | End: 2017-08-10

## 2017-08-10 RX ADMIN — LIDOCAINE HYDROCHLORIDE 2 ML: 10 INJECTION, SOLUTION EPIDURAL; INFILTRATION; INTRACAUDAL; PERINEURAL at 15:36

## 2017-08-10 RX ADMIN — SODIUM CHLORIDE 125 ML/HR: 900 INJECTION, SOLUTION INTRAVENOUS at 10:38

## 2017-08-10 RX ADMIN — TAMSULOSIN HYDROCHLORIDE 0.4 MG: 0.4 CAPSULE ORAL at 08:36

## 2017-08-10 RX ADMIN — Medication 400 MG: at 08:36

## 2017-08-10 NOTE — H&P
The patient is an appropriate candidate to undergo lumbar puncture. Patient assessed immediately prior to induction. Anesthesia plan as follows: Local/No Anesthesia. History and Physical update:  H&P was reviewed and the patient was examined. No changes have occurred in the patient's condition since the H&P was completed.     Navdeep Wilson MD  Vascular & Interventional Radiology  [facility]  8/10/2017

## 2017-08-10 NOTE — PROGRESS NOTES
1450 pt off unit via bed to xray for lumbar puncture. 1545 pt returned to unit via Bed, instructed to lie flat for 1 hour. 1630 report called to Ramón Guillory RN at [facility]. Pt going to bed [address] on [address] pt resting quietly in bed. No c/o voiced. 1730 pt picked up via ambulance for transfer. Shift summary: pt pleasant and cooperative with care. No c/o voiced. Pt up ad jyoti. Pt tolerating meals well. Pt informed of transfer to [facility]. Pt and family aware and verbalize understanding.

## 2017-08-10 NOTE — ROUTINE PROCESS
Bedside and Verbal shift change report given to ALDO Proctor (oncoming nurse) by ALDO Gutierrez RN (offgoing nurse). Report included the following information SBAR, Kardex, MAR and Recent Results.

## 2017-08-10 NOTE — PROGRESS NOTES
Patient was tearful and expressed some depression/confusion on suddenness of disease and processes. Prayer was offered and accepted by patient. Sat and visited for a while. Some c/o shortness of breath when laying down and coughing.  No other complaints    Patient Vitals for the past 12 hrs:   Temp Pulse Resp BP SpO2   08/10/17 0342 99.3 °F (37.4 °C) (!) 107 19 145/86 96 %   08/10/17 0020 99.3 °F (37.4 °C) 100 19 (!) 149/95 97 %   08/09/17 2006 100 °F (37.8 °C) (!) 107 19 (!) 149/93 96 %

## 2017-08-10 NOTE — PROGRESS NOTES
Notified Dr. Manjit Kwong requests this pt be transferred to [facility] given he does not have privileges at [facility]. No other. Anticipate pt will transfer with in the next 24 hours pending bed availability. No other plan of care needs have been identified for CM.

## 2017-08-10 NOTE — DISCHARGE SUMMARY
Parisa Appiah MD Physician Signed Internal Medicine Discharge Summaries Date of Service: 17 1650   Related encounter: US GUID [facility] from 2017 in [address]         []Hide copied text  []Srini for attribution information  Mg Potts SUMMARY     Name:  Ronda Perrin  MR#:  [id number]  :  1960  Account #:  [de-identified]  Date of Adm:  2017  Date of Discharge:        DISCHARGE DIAGNOSES:  1. Lymphoma. 2. Prostate cancer. 3. Hypercalcemia. 4. Hypomagnesemia. 5. Hypertension.     HISTORY OF PRESENT ILLNESS: This gentleman is a patient that I  follow in the office. Earlier this year, he was noted to have mild  elevation in PSA. He was referred to Urology. Ultimately, he ended up  going to [facility] for an MRI-guided biopsy of the prostate. One of  12 samples returned with cancer. His PSA's have been between 5 and  6. Recently he noted decreased energy. Also, a cough. Medication  was tried for the cough. A chest x-ray was completed that was normal.  Antihistamines were tried. An ACE inhibitor was discontinued. These  things did not help. Laboratories were drawn last week. His  laboratories came back with hypercalcemia that was in excess of 13. He also had low magnesium. His CBC demonstrated a normal white  blood cell count 6.8, hemoglobin 13.9, platelets 193. Given the  metabolic abnormalities, he was admitted to the hospital for evaluation  and treatment. During this time, an enlarged lymph node in the left  supraclavicular area became apparent. There was concern for the  possibility of a malignancy other than prostate cancer. I ordered a  biopsy that was accomplished by Radiology. This was completed on  2017. Oncology was consulted. The patient was seen in  consultation by Dr. Dov Herrera. Dr. Amina James thought that this  may involve a lymphoma in addition to the prostate cancer. The biopsy  has returned at this time.  It demonstrated what appears to be a B-cell  lymphoma. The full report is on the [address]. A port was  placed yesterday by Interventional Radiology. The patient tolerated this  very well. A CT of the chest was completed on 08/02/2017. This  showed left supraclavicular and multistational mediastinal adenopathy  concerning for metastatic disease. A CT of the abdomen and pelvis  was accomplished. It showed splenomegaly with numerous  hypoattenuating splenic lesions, multistation abdominal adenopathy  and an abnormal appearing left iliac bone with associated soft tissue  mass lesion in the left gluteus sharlene adjacent subcutaneous  tissues. Imaging findings are most suggestive of lymphomatous  process, possibly of diffuse large B-cell lymphoma and less  likely metastatic disease. There was a small left pleural effusion. An  MRI of the brain was accomplished. This showed mild atrophy and  chronic small vessel changes. Otherwise, it was unremarkable. An  echocardiogram was completed on 08/08/2017. This demonstrated an  ejection fraction of 55% to 60%. There were no regional wall motion  abnormalities. There was grade 1 diastolic dysfunction.     Last night Mr. Cristina Birmingham was seen by Dr. Alejandra Holden. More testing of biopsied tissue  was desired. Pathology had used all material obtained on his supraclavicular   LN biopsy. Another biopsy was obtained yesterday by Interventional Radiology. Tissues was sent to the lab. Patient seen by Dr Alejandra Holden this morning. He recommends   initiating inpatient chemotherapy. Germaine is unable to do this at this time. Dr. Alejandra Holden has requested that Mr. Cristina Birmingham be transferred to his care at the [facility] in [place], [place]. Currently efforts are being made to set up this transition.       DISCHARGE MEDICATIONS:  1. Flomax 0.4 mg p.o. every night. 2. Magnesium oxide 2 tablets daily. 3. Tessalon 100 mg 3 times daily. 4. Zofran 4 mg tablet every 6 hours as needed for nausea.   5. Norco 5/325 mg p.o. every 6 hours p.r.n. for pain.     DISCHARGE INSTRUCTIONS:     1. Transfer to Dr. Biju Morrell at [facility] to initiate chemotherapy for this lymphoma.         Results    CT ABD PELV W CONT (Accession [id number]) (Order [id number])         Allergies        No Known Allergies       Result Information      Status Provider Status        Final result (Exam End: 8/7/2017  4:17 PM) Open        Study Result      EXAM: CT of the abdomen and pelvis     INDICATION: 59 old patient with lymphadenopathy. Recent supraclavicular maxine  biopsy.     COMPARISON: CT of the chest 8/2/2017. No prior cross-sectional imaging of the  abdomen or pelvis is available for review.     TECHNIQUE: Axial CT imaging of the abdomen and pelvis was performed with  intravenous contrast. Multiplanar reformats were generated. One or more dose  reduction techniques were used on this CT: automated exposure control,  adjustment of the mAs and/or kVp according to patient's size, and iterative  reconstruction techniques. The specific techniques utilized on this CT exam have  been documented in the patient's electronic medical record.     _______________     FINDINGS:     LOWER CHEST: Images at the lung bases are degraded by motion artifact. The  cardiac size is normal. No pericardial effusion. Inferior mediastinal adenopathy  demonstrated, measuring approximately 4.6 x 3.0 cm in size.     LIVER, BILIARY: Hepatic parenchymal enhancement is uniform. No focal hepatic  lesion. No biliary dilation. Gallbladder is unremarkable.     PANCREAS: Normal.     SPLEEN: The spleen is enlarged, measuring approximate 16 cm in oblique sagittal  diameter. There are numerous family hypoattenuating splenic masses, the largest  present in the anterior lower pole measuring approximately 4.3 x 5.1 cm in size.     ADRENALS: Normal.     KIDNEYS/URETERS/BLADDER: Renal parenchymal enhancement is symmetric. There is no  hydronephrosis. There is no nephrolithiasis.  No distal ureteral or bladder  stone. Bladder is decompressed, with a mildly thick-walled appearance.     PELVIC ORGANS: Unremarkable.     VASCULATURE: Aortobiiliac atherosclerotic calcification is present without  evidence of aneurysmal dilatation.     LYMPH NODES: There is extensive retrocrural adenopathy as well as periaortic and  retroperitoneal adenopathy, which elevates the infrarenal abdominal aorta. Large  maxine conglomerate at the level of the lower pole left kidney measures  approximately 6.0 x 3.6 cm in size. The iliac maixne chains appear within normal  limits, as do the obturator chains.     GASTROINTESTINAL TRACT: There is a small hiatal hernia. The stomach, small  intestine, and large intestine are otherwise normal in course and caliber. No  bowel obstruction or perforation.     BONES: There is a permeative and diffusely abnormal appearance of the posterior  aspect of the left iliac bone, with associated soft tissue which expands the  left sacroiliac joint posteriorly, and infiltrates into the medial aspect of the  left gluteus sharlene and inferior left erector spinae. Although difficult to  measure given the relative isoattenuation to skeletal muscle, the soft tissue  abnormality dislocation measures approximately 4.0 x 7.1 cm in size, with an  additional soft tissue component extending superiorly from the posterior left  iliac (image 93) which measures approximately 4.3 x 2.9 cm in size.     _______________     IMPRESSION  IMPRESSION:     1.  Splenomegaly with numerous hypoattenuating splenic lesions; multistation  abdominal adenopathy which elevates the aorta; as well as an permeative abnormal  appearance of the left iliac bone with associated soft tissue mass lesion in the  left gluteus sharlene and adjacent subcutaneous tissues     -- imaging findings are most suggestive of a lymphomatous process, possibly a  diffuse large B-cell lymphoma and less likely a manifestation of metastatic  disease.  Correlation with same-day biopsy results is recommended.     The findings of bone scintigraphy performed for the purposes of prostate  carcinoma staging (8/1/2017) are likely explained by the osseous and soft tissue  abnormality involving the left iliac bone as above and are atypical for  appearance of carcinoma.     2. Small left pleural effusion.     3. Small hiatal hernia.     4. Normal caliber small and large intestine. No bowel obstruction.         Results    CTA CHEST W OR W WO CONT (Accession [id number]) (Order [id number])         Allergies        No Known Allergies       Result Information      Status Provider Status        Final result (Exam End: 8/2/2017  4:37 PM) Reviewed        Study Result      EXAM: CTA Chest     INDICATION: 68-year-old patient with history of prostate cancer and persistent  cough.     COMPARISON: No prior study.     TECHNIQUE: Axial CT imaging from the thoracic inlet through the diaphragm with  intravenous contrast utilizing CTA study for pulmonary artery evaluation. Coronal and sagittal MIP reformations were generated at a separate workstation. One or more dose reduction techniques were used on this CT: automated exposure  control, adjustment of the mAs and/or kVp according to patient's size, and  iterative reconstruction techniques. The specific techniques utilized on this CT  exam have been documented in the patient's electronic medical record.     _______________     FINDINGS:     EXAM QUALITY: Overall exam quality is adequate for exclusion of large, central  pulmonary embolus. Secondary to patient coughing, the examination is otherwise  considerably limited.     PULMONARY ARTERIES: There is no large, central pulmonary embolism present. The  main pulmonary arterial size is within normal limits.     MEDIASTINUM: The cardiac size is within normal limits. Thoracic aorta is normal  in course and caliber.  Scattered atherosclerotic calcifications of the aortic  valvular apparatus as well as the coronary arteries noted. No pericardial  effusion.     LYMPH NODES: Supraclavicular and multistation mediastinal adenopathy noted. Largest supraclavicular lymph node demonstrated at slice position 4, on the  left, measuring approximately 3.2 x 3.1 cm in size. Largest mediastinal lymph  node conglomerate appears to be at the subcarinal maxine station, where a maxine  mass measures approximately 2.9 x 3.5 cm in size. No axillary or hilar  adenopathy.     AIRWAY: Central airways are patent.     LUNGS: Evaluation of the lung bases in particular is considerably limited by  artifact, with dependent atelectatic opacities appreciated. No discrete  confluent pneumonic consolidation.     PLEURA: No pneumothorax or pleural effusion.     UPPER ABDOMEN: Small hiatal hernia. Upper abdominal structures limited by motion  artifact. .     OTHER: No acute or aggressive osseous abnormalities identified within the  limitations of motion.     _______________     IMPRESSION  IMPRESSION:     1. Technically limited examination as above secondary to persistent coughing.     2. No evidence of large, central pulmonary embolism.     3. Left supraclavicular and multistation mediastinal adenopathy most concerning  for underlying malignancy, with differential considerations including a  manifestation of metastatic disease versus lymphoproliferative process such as  lymphoma.     4. Small hiatal hernia.     Technical limitations and findings of this examination discussed with Yane Christian PA-C in the emergency room at the time of the examination. Results    MRI BRAIN W WO CONT (Accession [id number]) (Order [id number])         Allergies        No Known Allergies       Result Information      Status Provider Status        Final result (Exam End: 8/8/2017 10:28 AM) Open        Study Result      EXAM: MRI brain without and with contrast 8/8/2017     INDICATION: Extranodal non-Hodgkin's lymphoma.   Remote history of testicular  cancer.     COMPARISON: None.     TECHNIQUE: Multiplanar multisequential images of the brain were obtained before  and after the administration of 7.5 cc of IV Gadovist.     _______________     FINDINGS:     BRAIN AND POSTERIOR FOSSA: Mild atrophy and chronic small vessel changes are  present. . There is no intracranial hemorrhage, mass effect, or midline shift. There are no significant additional areas of abnormal parenchymal signal. There  are no areas of restricted diffusion to suggest acute infarct. There is no  abnormal cerebral or cerebellar enhancement.     EXTRA-AXIAL SPACES AND MENINGES: There are no abnormal extra-axial fluid  collections. There is no abnormal meningeal enhancement.     VASCULAR: The visualized portions of the intracranial carotid and  vertebrobasilar systems demonstrate patent appearing flow voids.     CALVARIA: Normal.     SINUSES: Mild mucosal thickening is noted throughout the paranasal sinuses. No  air-fluid levels are present. The visualized portions of the mastoid air cells  are clear. .     CRANIOCERVICAL JUNCTION: Normal.     OTHER: None.     _______________     IMPRESSION  IMPRESSION:     1. Mild atrophy and chronic small vessel changes.     2.  Otherwise, unremarkable evaluation. Specifically, no acute intracranial  abnormalities are identified and there is no abnormal intracranial enhancement.      Results    NM BONE SCAN [facility] BODY (Accession [id number]) (Order [id number])         Allergies        No Known Allergies       Result Information      Status Provider Status        Final result (Exam End: 7/31/2017  1:52 PM) Open        Study Result      WHOLE BODY BONE SCAN:     Indication: Prostate cancer.     Technique: Anterior and posterior images of the whole body were performed after  the intravenous administration of 91.9 millicuries of technetium 99m MDP.     Injection site:  Left antecubital fossa.     Prior bone scans: None     Correlative anatomic imaging: None.     Findings: Moderate focus of uptake involving the left side of the mid cervical  spine.     Asymmetric uptake involving the medial aspect of the left ilium.     Mild uptake involving both shoulders, both hips both knees, both ankles/feet,  although nonspecific, typically degenerative in etiology.     Bilateral renal uptake is present.      IMPRESSION  IMPRESSION:     1. Asymmetric uptake involving the medial aspect of the left iliac bone. Findings concerning for osseous metastatic disease. Suggest anatomic correlation  especially with any previous outside CTs.     2. Moderate focus of uptake involving left side of the mid cervical spine,  although nonspecific, may be degenerative in etiology.         Results    US GUIDE BX LYMPH NOD SUPER (Accession [id number]) (Order [id number])         Allergies        No Known Allergies       Result Information      Status Provider Status        Final result (Exam End: 8/9/2017  2:08 PM) Open       Results    US GUIDE BX LYMPH NOD SUPER (Accession [id number]) (Order [id number])         Allergies        No Known Allergies       Result Information      Status Provider Status        Final result (Exam End: 8/7/2017 11:41 AM) Open        Study Result      ULTRASOUND GUIDED FINE NEEDLE ASPIRATION AND CORE BIOPSY OF LEFT SUPRACLAVICULAR  LYMPH NODES:     Indication: History of prostate cancer. Rapidly enlarging left supraclavicular  lymph nodes.     Technique/findings: After the procedure, as well as its risks, benefits and  alternatives were explained to the patient, and all questions answered, written  informed consent was obtained and witnessed. The procedure was performed under  conscious sedation with constant monitoring.     The lymph nodes in the left supraclavicular region was localized under  ultrasound guidance. The skin was marked, prepped and draped in sterile fashion  and bicarbonate buffered lidocaine was used for local anesthesia.   Initially,  25-gauge fine-needle aspiration was performed. Total 3 passes were performed. Initial interpretation of the sample demonstrated epithelial lesion per Dr. Bryce Whyte (pathology). Using a Temno coaxial system, a 19 gauge introducer needle  was advanced into adjacent larger lymph node. A total of 5-20 gauge core  biopsies were obtained in placed directly into the specimen cup. Specimen deemed  adequate. Standard postprocedure pause. The patient tolerated the procedure well  and there were no immediate complications.     GUIDANCE: Ultrasound guidance was used to position (and confirm the position of)  the needle. Image(s) saved in PACS: Ultrasound     Preprocedure timeout: 1100 hours.        IMPRESSION  IMPRESSION:     Successful ultrasound guided fine needle aspiration and core biopsy of left  supraclavicular lymph nodes. Initial interpretation demonstrated epithelial  lesion.                  Study Result      CT guided core biopsy of left iliac/gluteal mass     Indication: Left supraclavicular lymph node biopsy on 08/07/2017 demonstrating  B-cell lymphoma, additional tissue needed for molecular studies.     Correlation: CT abdomen and pelvis 08/07/2017. Procedure was discussed with Dr. Anitha Ojeda and acceptable to biopsy left gluteal/iliac mass.     Technique/findings: After the procedure, as well as its risks, benefits and  alternatives were explained to the patient, and all questions answered, written  informed consent was obtained and witnessed. The procedure was performed under  local sedation. Sterile ultrasound gel and probe cover were used.     Patient was placed prone and left gluteal/iliac mass was localized with  ultrasound. The overlying skin was marked, prepped and draped in sterile fashion  and lidocaine was used for local anesthesia. Using a Temno coaxial system, a 17  gauge introducer needle was advanced into the mass under ultrasound guidance.  A  18-gauge core was obtained and touch prep demonstrating positive sampling for  malignancy. As per Dr. Ian Hansen request,  4-18 gauge good quality core biopsies  were obtained for formalin. Additional 4 good quality cores were obtained for  flow cytometry and placed directly in RPMI. The specimen was deemed adequate and  satisfactory by the pathologist.  Needle was removed. Sterile dressing applied. The patient tolerated the procedure well and there were no immediate  complications.     GUIDANCE: Ultrasound guidance was  used to position (and confirm the position  of) the needle. Image(s) saved in PACS: Ultrasound.      IMPRESSION  IMPRESSION:     Successful US guided core biopsy from left gluteal/iliac mass as detailed above. Specimen deemed adequate and satisfactory by pathologist, Dr. Abdulkadir Agarwal.       Imaging          2017 12:05 PM - Adiel, Card Result In       Narrative      [facility] [place]  [address]  [phone]    Transthoracic Echocardiogram    Patient: Carolina Man  MRN: [id number]  [address]  #: [de-identified]  : 1960  Age: 59 years  Gender: Male  Height: 72 in  Weight: 207.7 lb  BSA: 2.17 m-sq  BP: 130 / 87 mmHg  Study date: 08-Aug-2017  Status: Routine  Location: [address] [place] [address] ACC #: [id number]    Allergies: NO KNOWN ALLERGIES    Referring_Ordering Physician: Yareli Howell MD  Interpreting Physician: Refugio Samuels MD  Technologist: Queen Loi. TYRA Cheema    SUMMARY:  Left ventricle: Systolic function was normal. Ejection fraction was  estimated in the range of 55 % to 60 %. There were no regional wall motion  abnormalities. Wall thickness was normal. Grade I diastolic dysfunction. Aortic valve: Aortic valve not well visualized. Can not comment on  structure of aortic valve. Peak velocity 2.73 m/sec and mean gradient was  20 mm hg across aortic valve. There was mild aortic stenosis. PROCEDURE: This was a routine study.  The study included complete 2D  imaging, M-mode, complete spectral Doppler, and color Doppler. The heart  rate was 97 bpm, at the start of the study. Systolic blood pressure was  130 mmHg, at the start of the study. Diastolic blood pressure was 87 mmHg,  at the start of the study. Image quality was adequate. LEFT VENTRICLE: Size was normal. Systolic function was normal. Ejection  fraction was estimated in the range of 55 % to 60 %. There were no  regional wall motion abnormalities. Wall thickness was normal. Grade I  diastolic dysfunction. RIGHT VENTRICLE: The size was normal. Systolic function was normal. Wall  thickness was normal.    LEFT ATRIUM: Size was normal. LA volume index was 16 ml/m-sq. RIGHT ATRIUM: Appears normal in size. MITRAL VALVE: Normal valve structure. There was normal leaflet separation. DOPPLER: The transmitral velocity was within the normal range. There was  no evidence for stenosis. There was no regurgitation. AORTIC VALVE: Aortic valve not well visualized. Can not comment on  structure of aortic valve. DOPPLER: Peak velocity 2.73 m/sec and mean  gradient was 20 mm hg across aortic valve. There was mild aortic stenosis. There was no regurgitation. TRICUSPID VALVE: Tricuspid valve not well visualized. DOPPLER: The  transtricuspid velocity was within the normal range. There was no evidence  for tricuspid stenosis. There was no regurgitation. PULMONIC VALVE: Not well visualized, but normal Doppler findings. AORTA: The root exhibited normal size. SYSTEMIC VEINS: IVC: The inferior vena cava was normal in size and course. Respirophasic changes were normal.    PERICARDIUM: There was no pericardial effusion. The pericardium was normal  in appearance.     SYSTEM MEASUREMENT TABLES    2D  Ao Diam: 3.5 cm  LVOT Diam: 2 cm  EF(Teich): 54.9 %  IVSd: 0.8 cm  LVIDd: 5.7 cm  LVIDs: 4 cm  LVPWd: 1.1 cm  LAESV Index (A-L): 16.4 ml/m2    CW  AV Vmean: 2 m/s  AV meanP.3 mmHg  TR Vmax: 1.6 m/s  TR maxPG: 10.1 mmHg    PW  LVOT Vmax: 1 m/s  LVOT maxP.4 mmHg  MV E/A Ratio: 0.8  KEVIN (VTI): 1.1 cm2  Lateral E': 0.1 m/s  Lateral E/E': 9.5  SEPTAL E': 0.1 m/s  SEPTAL E/E': 11.4    Prepared and E-signed by               TIME SPENT: 55 minutes have been spent reviewing the chart and  coordinating care.           LEIGH Baker / Yanick Recinos  D:  08/09/2017   10:15  T:  08/09/2017   16:50  Job #:  [id number]             Last signed by: Dwayne Oliver MD at 08/10/17 1016

## 2017-08-10 NOTE — PROGRESS NOTES
Received info that Mr. Ramos need ot start chemo in inpt environment. RN clinical specialist indicates that no pharmacy chemo coverage here at [facility] this weekend. RN indicated that Carmela Underwood wants his transferred to [facility] [place] on his oncology inpt service to initiate chemo. DC summary created and ready. BERNARDA completed online. Will standby for more details.

## 2017-08-10 NOTE — PROCEDURES
Vascular & Interventional Radiology Brief Procedure Note    Interventional Radiologist: Haven Morelos    Pre-operative Diagnosis:  lymphoma    Post-operative Diagnosis: Same as pre-op dx    Procedure(s) Performed:  Lumbar Puncture    Anesthesia:  Local    Findings:  Uneventful LP. [id number], 20g needle, 16cc clear, colorless CSF. Complications: None    Estimated Blood Loss:  None. Tubes and Drains: None    Specimens: CSF taken to lab. Condition: Good     Plan: Bedrest x 24 hrs.       Damir Pizarro MD  [facility]

## 2017-08-10 NOTE — ROUTINE PROCESS
Bedside shift change report given to Fabiola Ugalde Rn (oncoming nurse) by Kailey Olson RN (offgoing nurse). Report included the following information SBAR, Kardex and MAR.

## 2017-08-12 LAB — PTH RELATED PROT SERPL-SCNC: <1.1 PMOL/L

## 2017-11-02 ENCOUNTER — HOSPITAL ENCOUNTER (OUTPATIENT)
Dept: CT IMAGING | Age: 57
Discharge: HOME | End: 2017-11-02
Attending: SPECIALIST
Payer: COMMERCIAL

## 2017-11-02 DIAGNOSIS — C85.90 MALIGNANT LYMPHOMA, HISTIOCYTIC (HCC): ICD-10-CM

## 2017-11-02 PROCEDURE — 74011636320 HC RX REV CODE- 636/320: Performed by: SPECIALIST

## 2017-11-02 PROCEDURE — 74177 CT ABD & PELVIS W/CONTRAST: CPT

## 2017-11-02 PROCEDURE — 74011000255 HC RX REV CODE- 255: Performed by: SPECIALIST

## 2017-11-02 RX ORDER — BARIUM SULFATE 20 MG/ML
900 SUSPENSION ORAL
Status: COMPLETED | OUTPATIENT
Start: 2017-11-02 | End: 2017-11-02

## 2017-11-02 RX ADMIN — IOPAMIDOL 100 ML: 612 INJECTION, SOLUTION INTRAVENOUS at 09:56

## 2017-11-02 RX ADMIN — BARIUM SULFATE 900 ML: 20 SUSPENSION ORAL at 09:56

## 2017-12-19 ENCOUNTER — HOSPITAL ENCOUNTER (OUTPATIENT)
Dept: PET IMAGING | Age: 57
Discharge: HOME | End: 2017-12-19
Attending: SPECIALIST
Payer: COMMERCIAL

## 2017-12-19 DIAGNOSIS — C85.90 MALIGNANT LYMPHOMA, HISTIOCYTIC (HCC): ICD-10-CM

## 2017-12-19 PROCEDURE — A9552 F18 FDG: HCPCS

## 2018-04-06 ENCOUNTER — HOSPITAL ENCOUNTER (OUTPATIENT)
Dept: CT IMAGING | Age: 58
Discharge: HOME | End: 2018-04-06
Attending: SPECIALIST
Payer: COMMERCIAL

## 2018-04-06 DIAGNOSIS — C83.30 DIFFUSE LARGE CELL NON-HODGKIN'S LYMPHOMA (HCC): ICD-10-CM

## 2018-04-06 LAB — CREAT UR-MCNC: 1.2 MG/DL (ref 0.6–1.3)

## 2018-04-06 PROCEDURE — 74011000255 HC RX REV CODE- 255: Performed by: SPECIALIST

## 2018-04-06 PROCEDURE — 74177 CT ABD & PELVIS W/CONTRAST: CPT

## 2018-04-06 PROCEDURE — 82565 ASSAY OF CREATININE: CPT

## 2018-04-06 PROCEDURE — 74011636320 HC RX REV CODE- 636/320: Performed by: SPECIALIST

## 2018-04-06 RX ORDER — BARIUM SULFATE 20 MG/ML
900 SUSPENSION ORAL
Status: COMPLETED | OUTPATIENT
Start: 2018-04-06 | End: 2018-04-06

## 2018-04-06 RX ADMIN — IOPAMIDOL 100 ML: 612 INJECTION, SOLUTION INTRAVENOUS at 09:49

## 2018-04-06 RX ADMIN — BARIUM SULFATE 900 ML: 20 SUSPENSION ORAL at 09:46

## 2018-07-24 ENCOUNTER — HOSPITAL ENCOUNTER (OUTPATIENT)
Dept: PET IMAGING | Age: 58
Discharge: HOME | End: 2018-07-24
Attending: SPECIALIST
Payer: COMMERCIAL

## 2018-07-24 DIAGNOSIS — C83.30 DIFFUSE LARGE B CELL LYMPHOMA (HCC): ICD-10-CM

## 2018-07-24 PROCEDURE — A9552 F18 FDG: HCPCS

## 2018-10-27 ENCOUNTER — HOSPITAL ENCOUNTER (OUTPATIENT)
Dept: MRI IMAGING | Age: 58
Discharge: HOME | End: 2018-10-27
Attending: ORTHOPAEDIC SURGERY
Payer: COMMERCIAL

## 2018-10-27 DIAGNOSIS — S43.421A SPRAIN OF RIGHT ROTATOR CUFF CAPSULE, INITIAL ENCOUNTER: ICD-10-CM

## 2018-10-27 PROCEDURE — 73221 MRI JOINT UPR EXTREM W/O DYE: CPT

## 2019-01-03 NOTE — ROUTINE PROCESS
Relayed below information from Dr. King to pt.  Helped pt schedule a lab only appt after her US on Monday, 1/7/19.    Melva Finn RN     Ultrasound Guided Left Supraclavicular Lymph node core biopsy and FNA was preformed. Patient tolerated procedure with out pain. Patient was transported back to floor in stable condition. Pathologist present during procedure.

## 2019-07-16 ENCOUNTER — HOSPITAL ENCOUNTER (OUTPATIENT)
Dept: PET IMAGING | Age: 59
Discharge: HOME | End: 2019-07-16
Attending: NURSE PRACTITIONER
Payer: OTHER GOVERNMENT

## 2019-07-16 DIAGNOSIS — C83.30: ICD-10-CM

## 2019-07-16 PROCEDURE — A9552 F18 FDG: HCPCS

## 2019-08-02 RX ORDER — ERGOCALCIFEROL 1.25 MG/1
50000 CAPSULE ORAL
COMMUNITY
End: 2021-08-23

## 2019-08-02 RX ORDER — NORTRIPTYLINE HYDROCHLORIDE 10 MG/1
10 CAPSULE ORAL
COMMUNITY
End: 2019-08-02

## 2019-08-02 RX ORDER — NORTRIPTYLINE HYDROCHLORIDE 10 MG/1
10 CAPSULE ORAL
COMMUNITY
End: 2021-08-23

## 2019-08-02 RX ORDER — KETOCONAZOLE 20 MG/G
CREAM TOPICAL DAILY
COMMUNITY
End: 2021-08-23

## 2019-08-02 NOTE — PROGRESS NOTES
PT aware of NPO status. NPO after MN night prior to procedure. PT aware of need to hold anticoagulants per protocol. Pt denies. PT aware of potential for sedation administration and need for  at discharge. PT aware of arrival time pre procedure. Arrive at [facility] radiology waiting room on 8/7/19 at 1000 for scheduled procedure to occur at 1130. Pt states no questions at this time. Gave pt [facility] radiology rn phone number [phone].

## 2019-08-07 ENCOUNTER — HOSPITAL ENCOUNTER (OUTPATIENT)
Dept: INTERVENTIONAL RADIOLOGY/VASCULAR | Age: 59
Discharge: HOME | End: 2019-08-07
Attending: NURSE PRACTITIONER | Admitting: RADIOLOGY
Payer: OTHER GOVERNMENT

## 2019-08-07 VITALS
TEMPERATURE: 97.4 F | OXYGEN SATURATION: 96 % | HEIGHT: 72 IN | DIASTOLIC BLOOD PRESSURE: 57 MMHG | WEIGHT: 252.38 LBS | BODY MASS INDEX: 34.18 KG/M2 | RESPIRATION RATE: 16 BRPM | HEART RATE: 98 BPM | SYSTOLIC BLOOD PRESSURE: 100 MMHG

## 2019-08-07 DIAGNOSIS — C83.30 RETICULOSARCOMA (HCC): ICD-10-CM

## 2019-08-07 LAB
APTT PPP: 27.6 SEC (ref 23–36.4)
INR PPP: 0.9 (ref 0.8–1.2)
PROTHROMBIN TIME: 12.2 SEC (ref 11.5–15.2)

## 2019-08-07 PROCEDURE — 74011250636 HC RX REV CODE- 250/636: Performed by: RADIOLOGY

## 2019-08-07 PROCEDURE — 85610 PROTHROMBIN TIME: CPT

## 2019-08-07 PROCEDURE — 74011250636 HC RX REV CODE- 250/636

## 2019-08-07 PROCEDURE — 99152 MOD SED SAME PHYS/QHP 5/>YRS: CPT

## 2019-08-07 PROCEDURE — 99153 MOD SED SAME PHYS/QHP EA: CPT

## 2019-08-07 PROCEDURE — 85730 THROMBOPLASTIN TIME PARTIAL: CPT

## 2019-08-07 PROCEDURE — 36590 REMOVAL TUNNELED CV CATH: CPT

## 2019-08-07 RX ORDER — LIDOCAINE HYDROCHLORIDE AND EPINEPHRINE 10; 10 MG/ML; UG/ML
1-20 INJECTION, SOLUTION INFILTRATION; PERINEURAL
Status: DISCONTINUED | OUTPATIENT
Start: 2019-08-07 | End: 2019-08-07 | Stop reason: HOSPADM

## 2019-08-07 RX ORDER — LIDOCAINE HYDROCHLORIDE AND EPINEPHRINE 10; 10 MG/ML; UG/ML
INJECTION, SOLUTION INFILTRATION; PERINEURAL
Status: DISCONTINUED
Start: 2019-08-07 | End: 2019-08-07 | Stop reason: HOSPADM

## 2019-08-07 RX ORDER — LISINOPRIL 10 MG/1
TABLET ORAL DAILY
COMMUNITY
End: 2021-08-23

## 2019-08-07 RX ORDER — FENTANYL CITRATE 50 UG/ML
25-200 INJECTION, SOLUTION INTRAMUSCULAR; INTRAVENOUS
Status: DISCONTINUED | OUTPATIENT
Start: 2019-08-07 | End: 2019-08-07 | Stop reason: HOSPADM

## 2019-08-07 RX ORDER — HYDROCODONE BITARTRATE AND ACETAMINOPHEN 5; 325 MG/1; MG/1
1 TABLET ORAL
Status: DISCONTINUED | OUTPATIENT
Start: 2019-08-07 | End: 2019-08-07 | Stop reason: HOSPADM

## 2019-08-07 RX ORDER — LIDOCAINE HYDROCHLORIDE 10 MG/ML
1-20 INJECTION INFILTRATION; PERINEURAL
Status: DISCONTINUED | OUTPATIENT
Start: 2019-08-07 | End: 2019-08-07 | Stop reason: HOSPADM

## 2019-08-07 RX ORDER — LIDOCAINE HYDROCHLORIDE AND EPINEPHRINE 10; 10 MG/ML; UG/ML
1-20 INJECTION, SOLUTION INFILTRATION; PERINEURAL
Status: CANCELLED | OUTPATIENT
Start: 2019-08-07 | End: 2019-08-07

## 2019-08-07 RX ORDER — ONDANSETRON 2 MG/ML
4 INJECTION INTRAMUSCULAR; INTRAVENOUS AS NEEDED
Status: DISCONTINUED | OUTPATIENT
Start: 2019-08-07 | End: 2019-08-07 | Stop reason: HOSPADM

## 2019-08-07 RX ORDER — LIDOCAINE HYDROCHLORIDE 10 MG/ML
INJECTION INFILTRATION; PERINEURAL
Status: DISCONTINUED
Start: 2019-08-07 | End: 2019-08-07 | Stop reason: HOSPADM

## 2019-08-07 RX ORDER — SODIUM CHLORIDE 9 MG/ML
25 INJECTION, SOLUTION INTRAVENOUS CONTINUOUS
Status: DISCONTINUED | OUTPATIENT
Start: 2019-08-07 | End: 2019-08-07 | Stop reason: HOSPADM

## 2019-08-07 RX ORDER — LIDOCAINE HYDROCHLORIDE 10 MG/ML
1-20 INJECTION INFILTRATION; PERINEURAL
Status: CANCELLED | OUTPATIENT
Start: 2019-08-07 | End: 2019-08-07

## 2019-08-07 RX ORDER — HEPARIN SODIUM 200 [USP'U]/100ML
INJECTION, SOLUTION INTRAVENOUS
Status: COMPLETED
Start: 2019-08-07 | End: 2019-08-07

## 2019-08-07 RX ORDER — NALOXONE HYDROCHLORIDE 0.4 MG/ML
INJECTION, SOLUTION INTRAMUSCULAR; INTRAVENOUS; SUBCUTANEOUS
Status: DISCONTINUED
Start: 2019-08-07 | End: 2019-08-07 | Stop reason: WASHOUT

## 2019-08-07 RX ORDER — FLUMAZENIL 0.1 MG/ML
INJECTION INTRAVENOUS
Status: DISCONTINUED
Start: 2019-08-07 | End: 2019-08-07 | Stop reason: WASHOUT

## 2019-08-07 RX ORDER — FLUMAZENIL 0.1 MG/ML
0.2 INJECTION INTRAVENOUS
Status: DISCONTINUED | OUTPATIENT
Start: 2019-08-07 | End: 2019-08-07 | Stop reason: HOSPADM

## 2019-08-07 RX ORDER — NALOXONE HYDROCHLORIDE 0.4 MG/ML
0.4 INJECTION, SOLUTION INTRAMUSCULAR; INTRAVENOUS; SUBCUTANEOUS AS NEEDED
Status: DISCONTINUED | OUTPATIENT
Start: 2019-08-07 | End: 2019-08-07 | Stop reason: HOSPADM

## 2019-08-07 RX ORDER — HEPARIN SODIUM 200 [USP'U]/100ML
500 INJECTION, SOLUTION INTRAVENOUS
Status: CANCELLED | OUTPATIENT
Start: 2019-08-07 | End: 2019-08-07

## 2019-08-07 RX ORDER — MIDAZOLAM HYDROCHLORIDE 1 MG/ML
.5-4 INJECTION, SOLUTION INTRAMUSCULAR; INTRAVENOUS
Status: DISCONTINUED | OUTPATIENT
Start: 2019-08-07 | End: 2019-08-07 | Stop reason: HOSPADM

## 2019-08-07 RX ORDER — MIDAZOLAM HYDROCHLORIDE 1 MG/ML
INJECTION, SOLUTION INTRAMUSCULAR; INTRAVENOUS
Status: COMPLETED
Start: 2019-08-07 | End: 2019-08-07

## 2019-08-07 RX ORDER — FENTANYL CITRATE 50 UG/ML
INJECTION, SOLUTION INTRAMUSCULAR; INTRAVENOUS
Status: COMPLETED
Start: 2019-08-07 | End: 2019-08-07

## 2019-08-07 RX ADMIN — HEPARIN SODIUM 1000 UNITS: 200 INJECTION, SOLUTION INTRAVENOUS at 11:12

## 2019-08-07 RX ADMIN — FENTANYL CITRATE 50 MCG: 50 INJECTION, SOLUTION INTRAMUSCULAR; INTRAVENOUS at 10:56

## 2019-08-07 RX ADMIN — SODIUM CHLORIDE 25 ML/HR: 900 INJECTION, SOLUTION INTRAVENOUS at 10:08

## 2019-08-07 RX ADMIN — MIDAZOLAM HYDROCHLORIDE 1 MG: 1 INJECTION, SOLUTION INTRAMUSCULAR; INTRAVENOUS at 10:56

## 2019-08-07 RX ADMIN — MIDAZOLAM HYDROCHLORIDE 1 MG: 1 INJECTION, SOLUTION INTRAMUSCULAR; INTRAVENOUS at 11:02

## 2019-08-07 RX ADMIN — LIDOCAINE HYDROCHLORIDE 10 ML: 10 INJECTION, SOLUTION INFILTRATION; PERINEURAL at 11:31

## 2019-08-07 RX ADMIN — FENTANYL CITRATE 50 MCG: 50 INJECTION, SOLUTION INTRAMUSCULAR; INTRAVENOUS at 11:00

## 2019-08-07 NOTE — PROGRESS NOTES
TRANSFER - OUT REPORT:    Verbal report given to Jax Ann RN(name) on Uri Jessica  being transferred to Care Unit(unit) for routine progression of care       Report consisted of patients Situation, Background, Assessment and   Recommendations(SBAR). Information from the following report(s) SBAR, Procedure Summary and MAR was reviewed with the receiving nurse. Lines:   Peripheral IV 08/07/19 Right;Mid Arm (Active)   Site Assessment Clean, dry, & intact 8/7/2019 10:08 AM   Phlebitis Assessment 0 8/7/2019 10:08 AM   Infiltration Assessment 0 8/7/2019 10:08 AM   Dressing Status Clean, dry, & intact 8/7/2019 10:08 AM   Dressing Type Transparent 8/7/2019 10:08 AM   Hub Color/Line Status Pink 8/7/2019 10:08 AM        Opportunity for questions and clarification was provided.       Patient transported with:   Registered Nurse details… detailed exam

## 2019-08-07 NOTE — PROCEDURES
Vascular & Interventional Radiology Brief Procedure Note    Interventional Radiologist: Ellen    Pre-operative Diagnosis:  Completed Therapy    Post-operative Diagnosis: Same as pre-op dx    Procedure(s) Performed:  RT IJ/Chest Wall Mediport Removal    Anesthesia:  Local and Moderate Sedation    Findings:  Uneventful removal of RT chest mediport    Complications: None    Estimated Blood Loss:  minimal    Tubes and Drains: None    Specimens: None    Condition: Good    Disposition:  Return to [address] [place] [address] for post procedure monitoring. Plan: Observe x 1 hr then d/c home.       Cherry Muir MD  [address] [address]  Vascular & Interventional Radiology  8/7/2019

## 2019-08-07 NOTE — DISCHARGE INSTRUCTIONS
[address] DISCHARGE SUMMARY from Nurse    PATIENT INSTRUCTIONS:    After general anesthesia or intravenous sedation, for 24 hours or while taking prescription Narcotics:  · Limit your activities  · Do not drive and operate hazardous machinery  · Do not make important personal or business decisions  · Do  not drink alcoholic beverages  · If you have not urinated within 8 hours after discharge, please contact your surgeon on call. Report the following to your surgeon:  · Excessive pain, swelling, redness or odor of or around the surgical area  · Temperature over 100.5  · Nausea and vomiting lasting longer than 4 hours or if unable to take medications  · Any signs of decreased circulation or nerve impairment to extremity: change in color, persistent  numbness, tingling, coldness or increase pain  · Any questions    What to do at Home:  Recommended activity: Activity as tolerated and no driving for today,     *  Please give a list of your current medications to your Primary Care Provider. *  Please update this list whenever your medications are discontinued, doses are      changed, or new medications (including over-the-counter products) are added. *  Please carry medication information at all times in case of emergency situations. These are general instructions for a healthy lifestyle:    No smoking/ No tobacco products/ Avoid exposure to second hand smoke  Surgeon General's Warning:  Quitting smoking now greatly reduces serious risk to your health.     Obesity, smoking, and sedentary lifestyle greatly increases your risk for illness    A healthy diet, regular physical exercise & weight monitoring are important for maintaining a healthy lifestyle    You may be retaining fluid if you have a history of heart failure or if you experience any of the following symptoms:  Weight gain of 3 pounds or more overnight or 5 pounds in a week, increased swelling in our hands or feet or shortness of breath while lying flat in bed. Please call your doctor as soon as you notice any of these symptoms; do not wait until your next office visit. The discharge information has been reviewed with the patient. The patient verbalized understanding. Discharge medications reviewed with the patient and appropriate educational materials and side effects teaching were provided. ___________________________________________________________________________________________________________________________________DRAIN/PORT/CATHETER REMOVAL  DISCHARGE INSTRUCTIONS    General Information:     Your doctor has ordered for us to remove your drain, port, or catheter. This could be that you do not need it anymore, it is not doing its job, your physician has decided on another plan for your treatment and/or it may need replacing. Home Care Instructions: You can resume your regular diet and medication regimen. Do not drink alcohol, drive, or make any important legal decisions in the next 24 hours. Do not lift anything heavier than a gallon of milk, or do anything strenuous for the next 24 hours. You will notice a dressing over the site of the removal. This dressing should stay in place until the site is healed. The dressing should be changed at least every 48 hours. You should change the dressing sooner if it becomes soiled or wet. The nurse who discharges you to home should review with you any wound care instructions. Resume your normal level of activity slowly. You may shower after 24 hours, but do not take a bath, swim or immerse yourself in water until the site has healed, and keep the dressing dry with plastic wrap while showering. The site may ooze for a couple of days. This drainage should lessen with each passing day. Call If:     You should call your Physician and/or the Radiology Nurse if you have any bleeding other than a small spot on your bandage.  Call if you have any signs of infection, fever, or increased pain at the site of the tube. Call if the oozing increases, if it changes color, or begins to have an odor. Follow-Up Instructions: Please see your ordering doctor as he/she has requested.      To Reach Us:        Patient Signature:  Date: 8/7/2019  Discharging Nurse: Lakshmi Mcdowell RN

## 2019-08-07 NOTE — PROGRESS NOTES
The patient is an appropriate candidate to undergo removal of RT chest mediport as he has completed therapy. Patient assessed immediately prior to induction. Anesthesia plan as follows: Moderate Sedation. Planned agent(s):  Versed & fentanyl    History and Physical update:  H&P was reviewed and the patient was examined. No changes have occurred in the patient's condition since the H&P was completed.     Alfred Weathers MD  Vascular & Interventional Radiology  [facility]  8/7/2019

## 2019-08-07 NOTE — PROGRESS NOTES
Tolerated diet well, denies pain. 1215 Assisted to bathroom,voided back to bed.   1245 Discharge instructions reviewed, verbalized understanding. Discharged home via w/c in care of wife in stable condition. Denies pain, Dressing intact to right chest no bleeding or swelling.

## 2019-10-29 ENCOUNTER — APPOINTMENT (OUTPATIENT)
Dept: CT IMAGING | Age: 59
End: 2019-10-29
Attending: NURSE PRACTITIONER
Payer: OTHER GOVERNMENT

## 2019-10-29 ENCOUNTER — HOSPITAL ENCOUNTER (OUTPATIENT)
Dept: CT IMAGING | Age: 59
Discharge: HOME | End: 2019-10-29
Attending: NURSE PRACTITIONER
Payer: OTHER GOVERNMENT

## 2019-10-29 DIAGNOSIS — C83.30: ICD-10-CM

## 2019-10-29 LAB — CREAT UR-MCNC: 1.2 MG/DL (ref 0.6–1.3)

## 2019-10-29 PROCEDURE — 70491 CT SOFT TISSUE NECK W/DYE: CPT

## 2019-10-29 PROCEDURE — 74011636320 HC RX REV CODE- 636/320

## 2019-10-29 PROCEDURE — 82565 ASSAY OF CREATININE: CPT

## 2019-10-29 PROCEDURE — 71260 CT THORAX DX C+: CPT

## 2019-10-29 RX ADMIN — IOPAMIDOL 100 ML: 612 INJECTION, SOLUTION INTRAVENOUS at 12:32

## 2019-11-26 ENCOUNTER — HOSPITAL ENCOUNTER (OUTPATIENT)
Dept: PET IMAGING | Age: 59
Discharge: HOME | End: 2019-11-26
Attending: NURSE PRACTITIONER
Payer: OTHER GOVERNMENT

## 2019-11-26 DIAGNOSIS — C83.30 LARGE CELL (DIFFUSE) NON-HODGKIN'S LYMPHOMA (HCC): ICD-10-CM

## 2019-11-26 PROCEDURE — A9552 F18 FDG: HCPCS

## 2020-07-16 ENCOUNTER — HOSPITAL ENCOUNTER (OUTPATIENT)
Dept: CT IMAGING | Age: 60
Discharge: HOME | End: 2020-07-16
Attending: PHYSICIAN ASSISTANT
Payer: OTHER GOVERNMENT

## 2020-07-16 DIAGNOSIS — C83.30: ICD-10-CM

## 2020-07-16 PROCEDURE — 74011636320 HC RX REV CODE- 636/320: Performed by: STUDENT IN AN ORGANIZED HEALTH CARE EDUCATION/TRAINING PROGRAM

## 2020-07-16 PROCEDURE — 74011000255 HC RX REV CODE- 255: Performed by: STUDENT IN AN ORGANIZED HEALTH CARE EDUCATION/TRAINING PROGRAM

## 2020-07-16 PROCEDURE — 74177 CT ABD & PELVIS W/CONTRAST: CPT

## 2020-07-16 PROCEDURE — 70491 CT SOFT TISSUE NECK W/DYE: CPT

## 2020-07-16 RX ORDER — BARIUM SULFATE 20 MG/ML
900 SUSPENSION ORAL
Status: COMPLETED | OUTPATIENT
Start: 2020-07-16 | End: 2020-07-16

## 2020-07-16 RX ADMIN — BARIUM SULFATE 900 ML: 20 SUSPENSION ORAL at 14:25

## 2020-07-16 RX ADMIN — IOPAMIDOL 100 ML: 612 INJECTION, SOLUTION INTRAVENOUS at 14:25

## 2020-08-11 ENCOUNTER — HOSPITAL ENCOUNTER (OUTPATIENT)
Dept: MRI IMAGING | Age: 60
Discharge: HOME | End: 2020-08-11
Attending: NURSE PRACTITIONER
Payer: OTHER GOVERNMENT

## 2020-08-11 DIAGNOSIS — C83.30 RETICULOSARCOMA (HCC): ICD-10-CM

## 2020-08-11 LAB — CREAT UR-MCNC: 1.2 MG/DL (ref 0.6–1.3)

## 2020-08-11 PROCEDURE — A9575 INJ GADOTERATE MEGLUMI 0.1ML: HCPCS

## 2020-08-11 PROCEDURE — 74011636320 HC RX REV CODE- 636/320

## 2020-08-11 PROCEDURE — 74183 MRI ABD W/O CNTR FLWD CNTR: CPT

## 2020-08-11 PROCEDURE — 82565 ASSAY OF CREATININE: CPT

## 2020-08-11 RX ADMIN — GADOTERATE MEGLUMINE 20 ML: 376.9 INJECTION INTRAVENOUS at 15:03

## 2020-10-20 ENCOUNTER — TRANSCRIBE ORDER (OUTPATIENT)
Dept: SCHEDULING | Age: 60
End: 2020-10-20

## 2020-10-20 DIAGNOSIS — C61 MALIGNANT NEOPLASM OF PROSTATE (HCC): ICD-10-CM

## 2020-10-20 DIAGNOSIS — C83.30: Primary | ICD-10-CM

## 2020-10-26 ENCOUNTER — TRANSCRIBE ORDER (OUTPATIENT)
Dept: SCHEDULING | Age: 60
End: 2020-10-26

## 2020-10-26 DIAGNOSIS — C61 MALIGNANT NEOPLASM OF PROSTATE (HCC): ICD-10-CM

## 2020-10-26 DIAGNOSIS — C83.30: Primary | ICD-10-CM

## 2020-11-23 ENCOUNTER — HOSPITAL ENCOUNTER (OUTPATIENT)
Dept: CT IMAGING | Age: 60
Discharge: HOME | End: 2020-11-23
Attending: NURSE PRACTITIONER
Payer: OTHER GOVERNMENT

## 2020-11-23 DIAGNOSIS — C83.30: ICD-10-CM

## 2020-11-23 DIAGNOSIS — C61 MALIGNANT NEOPLASM OF PROSTATE (HCC): ICD-10-CM

## 2020-11-23 LAB — CREAT UR-MCNC: 1 MG/DL (ref 0.6–1.3)

## 2020-11-23 PROCEDURE — 70491 CT SOFT TISSUE NECK W/DYE: CPT

## 2020-11-23 PROCEDURE — 74177 CT ABD & PELVIS W/CONTRAST: CPT

## 2020-11-23 PROCEDURE — 74011000255 HC RX REV CODE- 255: Performed by: RADIOLOGY

## 2020-11-23 PROCEDURE — 82565 ASSAY OF CREATININE: CPT

## 2020-11-23 PROCEDURE — 74011000636 HC RX REV CODE- 636: Performed by: RADIOLOGY

## 2020-11-23 RX ORDER — BARIUM SULFATE 20 MG/ML
900 SUSPENSION ORAL
Status: COMPLETED | OUTPATIENT
Start: 2020-11-23 | End: 2020-11-23

## 2020-11-23 RX ADMIN — IOPAMIDOL 100 ML: 612 INJECTION, SOLUTION INTRAVENOUS at 12:34

## 2020-11-23 RX ADMIN — BARIUM SULFATE 900 ML: 20 SUSPENSION ORAL at 12:34

## 2021-06-03 ENCOUNTER — TRANSCRIBE ORDER (OUTPATIENT)
Dept: SCHEDULING | Age: 61
End: 2021-06-03

## 2021-06-03 DIAGNOSIS — C83.30: Primary | ICD-10-CM

## 2021-06-17 ENCOUNTER — HOSPITAL ENCOUNTER (OUTPATIENT)
Dept: CT IMAGING | Age: 61
Discharge: HOME | End: 2021-06-17
Attending: SPECIALIST
Payer: OTHER GOVERNMENT

## 2021-06-17 DIAGNOSIS — C83.30: ICD-10-CM

## 2021-06-17 LAB — CREAT UR-MCNC: 1.2 MG/DL (ref 0.6–1.3)

## 2021-06-17 PROCEDURE — 74011000636 HC RX REV CODE- 636: Performed by: SPECIALIST

## 2021-06-17 PROCEDURE — 74177 CT ABD & PELVIS W/CONTRAST: CPT

## 2021-06-17 PROCEDURE — 82565 ASSAY OF CREATININE: CPT

## 2021-06-17 PROCEDURE — 74011000250 HC RX REV CODE- 250: Performed by: SPECIALIST

## 2021-06-17 RX ORDER — BARIUM SULFATE 20 MG/ML
900 SUSPENSION ORAL
Status: COMPLETED | OUTPATIENT
Start: 2021-06-17 | End: 2021-06-17

## 2021-06-17 RX ADMIN — IOPAMIDOL 100 ML: 612 INJECTION, SOLUTION INTRAVENOUS at 17:04

## 2021-06-17 RX ADMIN — BARIUM SULFATE 900 ML: 20 SUSPENSION ORAL at 17:04

## 2021-08-14 ENCOUNTER — HOSPITAL ENCOUNTER (EMERGENCY)
Age: 61
Discharge: HOME | End: 2021-08-15
Attending: EMERGENCY MEDICINE
Payer: OTHER GOVERNMENT

## 2021-08-14 VITALS
HEART RATE: 91 BPM | RESPIRATION RATE: 16 BRPM | BODY MASS INDEX: 35.21 KG/M2 | WEIGHT: 260 LBS | OXYGEN SATURATION: 98 % | TEMPERATURE: 97.3 F | SYSTOLIC BLOOD PRESSURE: 147 MMHG | HEIGHT: 72 IN | DIASTOLIC BLOOD PRESSURE: 85 MMHG

## 2021-08-14 DIAGNOSIS — R31.0 GROSS HEMATURIA: ICD-10-CM

## 2021-08-14 DIAGNOSIS — N30.01 ACUTE CYSTITIS WITH HEMATURIA: Primary | ICD-10-CM

## 2021-08-14 LAB
ANION GAP SERPL CALC-SCNC: 4 MMOL/L (ref 3–18)
APPEARANCE UR: ABNORMAL
BACTERIA URNS QL MICRO: NEGATIVE /HPF
BASOPHILS # BLD: 0 K/UL (ref 0–0.1)
BASOPHILS NFR BLD: 0 % (ref 0–2)
BILIRUB UR QL: ABNORMAL
BUN SERPL-MCNC: 12 MG/DL (ref 7–18)
BUN/CREAT SERPL: 11 (ref 12–20)
CALCIUM SERPL-MCNC: 9.2 MG/DL (ref 8.5–10.1)
CHLORIDE SERPL-SCNC: 108 MMOL/L (ref 100–111)
CO2 SERPL-SCNC: 27 MMOL/L (ref 21–32)
COLOR UR: ABNORMAL
CREAT SERPL-MCNC: 1.13 MG/DL (ref 0.6–1.3)
DIFFERENTIAL METHOD BLD: ABNORMAL
EOSINOPHIL # BLD: 0.2 K/UL (ref 0–0.4)
EOSINOPHIL NFR BLD: 2 % (ref 0–5)
EPITH CASTS URNS QL MICRO: NEGATIVE /LPF (ref 0–5)
ERYTHROCYTE [DISTWIDTH] IN BLOOD BY AUTOMATED COUNT: 13.2 % (ref 11.6–14.5)
GLUCOSE SERPL-MCNC: 98 MG/DL (ref 74–99)
GLUCOSE UR STRIP.AUTO-MCNC: NEGATIVE MG/DL
HCT VFR BLD AUTO: 43.5 % (ref 36–48)
HGB BLD-MCNC: 14.6 G/DL (ref 13–16)
HGB UR QL STRIP: ABNORMAL
KETONES UR QL STRIP.AUTO: 15 MG/DL
LEUKOCYTE ESTERASE UR QL STRIP.AUTO: NEGATIVE
LYMPHOCYTES # BLD: 1.3 K/UL (ref 0.9–3.6)
LYMPHOCYTES NFR BLD: 17 % (ref 21–52)
MCH RBC QN AUTO: 36.7 PG (ref 24–34)
MCHC RBC AUTO-ENTMCNC: 33.6 G/DL (ref 31–37)
MCV RBC AUTO: 109.3 FL (ref 74–97)
MONOCYTES # BLD: 0.7 K/UL (ref 0.05–1.2)
MONOCYTES NFR BLD: 9 % (ref 3–10)
NEUTS SEG # BLD: 5.4 K/UL (ref 1.8–8)
NEUTS SEG NFR BLD: 72 % (ref 40–73)
NITRITE UR QL STRIP.AUTO: POSITIVE
OTHER,OTHU: NORMAL
PH UR STRIP: 6 [PH] (ref 5–8)
PLATELET # BLD AUTO: 148 K/UL (ref 135–420)
PMV BLD AUTO: 10 FL (ref 9.2–11.8)
POTASSIUM SERPL-SCNC: 3.8 MMOL/L (ref 3.5–5.5)
PROT UR STRIP-MCNC: >300 MG/DL
RBC # BLD AUTO: 3.98 M/UL (ref 4.35–5.65)
RBC #/AREA URNS HPF: NORMAL /HPF (ref 0–5)
SODIUM SERPL-SCNC: 139 MMOL/L (ref 136–145)
SP GR UR REFRACTOMETRY: 1.01 (ref 1–1.03)
UROBILINOGEN UR QL STRIP.AUTO: 1 EU/DL (ref 0.2–1)
WBC # BLD AUTO: 7.5 K/UL (ref 4.6–13.2)
WBC URNS QL MICRO: NORMAL /HPF (ref 0–5)

## 2021-08-14 PROCEDURE — 81001 URINALYSIS AUTO W/SCOPE: CPT

## 2021-08-14 PROCEDURE — 99284 EMERGENCY DEPT VISIT MOD MDM: CPT

## 2021-08-14 PROCEDURE — 80048 BASIC METABOLIC PNL TOTAL CA: CPT

## 2021-08-14 PROCEDURE — 87086 URINE CULTURE/COLONY COUNT: CPT

## 2021-08-14 PROCEDURE — 85025 COMPLETE CBC W/AUTO DIFF WBC: CPT

## 2021-08-14 RX ORDER — SULFAMETHOXAZOLE AND TRIMETHOPRIM 800; 160 MG/1; MG/1
1 TABLET ORAL
Status: COMPLETED | OUTPATIENT
Start: 2021-08-14 | End: 2021-08-15

## 2021-08-14 RX ORDER — SULFAMETHOXAZOLE AND TRIMETHOPRIM 800; 160 MG/1; MG/1
1 TABLET ORAL 2 TIMES DAILY
Qty: 20 TABLET | Refills: 0 | Status: SHIPPED | OUTPATIENT
Start: 2021-08-14 | End: 2021-08-23

## 2021-08-15 PROCEDURE — 74011250637 HC RX REV CODE- 250/637: Performed by: PHYSICIAN ASSISTANT

## 2021-08-15 RX ADMIN — SULFAMETHOXAZOLE AND TRIMETHOPRIM 1 TABLET: 800; 160 TABLET ORAL at 00:16

## 2021-08-15 NOTE — ED PROVIDER NOTES
EMERGENCY DEPARTMENT HISTORY AND PHYSICAL EXAM    Date: 8/14/2021  Patient Name: rPaful Agn    History of Presenting Illness     Chief Complaint   Patient presents with    Blood in Urine         History Provided By: Patient    9:01 PM  Praful Ang is a 59 y.o. male with PMHX of non-Hodgkin's lymphoma, prostate cancer, hypertension who presents to the emergency department C/O hematuria which began this morning. His urologist is Dr. Olivier Pedro, he states he called and spoke to somebody that was on-call and they schedule an appointment for Thursday. They advised to seek evaluation if any dysuria or unable to urinate. Patient was diagnosed with non-Hodgkin's lymphoma 4 years ago, not currently on any treatment. He was also diagnosed with prostate cancer about 2 years ago and is status post radiation therapy and just completed 2 years of Lupron injections about 6 months ago. Pt denies abdominal pain, back pain, dysuria, abnormal penile discharge, injury or trauma, testicle pain, and any other sxs or complaints. PCP: Michelle Rea MD    Current Facility-Administered Medications   Medication Dose Route Frequency Provider Last Rate Last Admin    trimethoprim-sulfamethoxazole (BACTRIM DS, SEPTRA DS) 160-800 mg per tablet 1 Tablet  1 Tablet Oral NOW MITCH Reynoso         Current Outpatient Medications   Medication Sig Dispense Refill    trimethoprim-sulfamethoxazole (Bactrim DS) 160-800 mg per tablet Take 1 Tablet by mouth two (2) times a day for 10 days. 20 Tablet 0    lisinopril (PRINIVIL, ZESTRIL) 10 mg tablet Take  by mouth daily.  ketoconazole (NIZORAL) 2 % topical cream Apply  to affected area daily.  nortriptyline (PAMELOR) 10 mg capsule Take 10 mg by mouth nightly.  ergocalciferol (VITAMIN D2) 50,000 unit capsule Take 50,000 Units by mouth.  tamsulosin (FLOMAX) 0.4 mg capsule Take 1 Cap by mouth nightly.  1 Cap 1    clobetasol (TEMOVATE) 0.05 % topical cream APPLY A THIN LAYER TO THE AFFECTED AREA(S) 2 TIMES DAILY  1       Past History     Past Medical History:  Past Medical History:   Diagnosis Date    Cancer [facility] [address]     testicle and prostate    Cancer ([facility] [address]     lymphoma    Elevated PSA     Hypertension     Neuropathy     Prostate cancer ([facility] [address]     Sleep apnea        Past Surgical History:  Past Surgical History:   Procedure Laterality Date    COLONOSCOPY N/A 7/7/2017    COLONOSCOPY, POLYPECTOMY  performed by Frantz Tucker MD at [address]      tibia right and left; fibula right    HX ORCHIECTOMY      HX ORTHOPAEDIC      HX TONSIL AND ADENOIDECTOMY      HX UROLOGICAL      testicle     HX VASCULAR ACCESS      mediport    HX WRIST FRACTURE TX Left     pins and screws     IR [de-identified] TUNL CVAD W PORT/PUMP  8/7/2019       Family History:  No family history on file. Social History:  Social History     Tobacco Use    Smoking status: Never Smoker    Smokeless tobacco: Never Used   Substance Use Topics    Alcohol use: Yes     Alcohol/week: 35.0 standard drinks     Types: 35 Cans of beer per week    Drug use: No       Allergies:  No Known Allergies      Review of Systems   Review of Systems   Constitutional: Negative for fever. Gastrointestinal: Negative for abdominal pain. Genitourinary: Positive for hematuria. Negative for dysuria, flank pain and testicular pain. Musculoskeletal: Negative for back pain. All other systems reviewed and are negative. Physical Exam     Vitals:    08/14/21 2036 08/14/21 2037 08/14/21 2228   BP: (!) 147/85     Pulse: 91     Resp: 16     Temp:  97.3 °F (36.3 °C)    SpO2: 98%  98%   Weight: 117.9 kg (260 lb)     Height: 6' (1.829 m)       Physical Exam  Vital signs and nursing notes reviewed. CONSTITUTIONAL: Alert. Well-appearing; well-nourished; in no apparent distress. CV: Normal S1, S2; no murmurs, rubs, or gallops. No chest wall tenderness.   RESPIRATORY: Normal chest excursion with respiration; breath sounds clear and equal bilaterally; no wheezes, rhonchi, or rales. GI: Normal bowel sounds; non-distended; non-tender; no guarding or rigidity; no palpable organomegaly. No CVA tenderness. SKIN: Normal for age and race; warm; dry; good turgor; no apparent lesions or exudate. NEURO: A & O x3. PSYCH:  Mood and affect appropriate. Diagnostic Study Results     Labs -     Recent Results (from the past 12 hour(s))   URINALYSIS W/ RFLX MICROSCOPIC    Collection Time: 08/14/21  9:50 PM   Result Value Ref Range    Color RED      Appearance BLOODY      Specific gravity 1.015 1.003 - 1.030      pH (UA) 6.0 5.0 - 8.0      Protein >300 (A) NEG mg/dL    Glucose Negative NEG mg/dL    Ketone 15 (A) NEG mg/dL    Bilirubin MODERATE (A) NEG      Blood LARGE (A) NEG      Urobilinogen 1.0 0.2 - 1.0 EU/dL    Nitrites Positive (A) NEG      Leukocyte Esterase Negative NEG     CBC WITH AUTOMATED DIFF    Collection Time: 08/14/21  9:50 PM   Result Value Ref Range    WBC 7.5 4.6 - 13.2 K/uL    RBC 3.98 (L) 4.35 - 5.65 M/uL    HGB 14.6 13.0 - 16.0 g/dL    HCT 43.5 36.0 - 48.0 %    .3 (H) 74.0 - 97.0 FL    MCH 36.7 (H) 24.0 - 34.0 PG    MCHC 33.6 31.0 - 37.0 g/dL    RDW 13.2 11.6 - 14.5 %    PLATELET 951 815 - 271 K/uL    MPV 10.0 9.2 - 11.8 FL    NEUTROPHILS 72 40 - 73 %    LYMPHOCYTES 17 (L) 21 - 52 %    MONOCYTES 9 3 - 10 %    EOSINOPHILS 2 0 - 5 %    BASOPHILS 0 0 - 2 %    ABS. NEUTROPHILS 5.4 1.8 - 8.0 K/UL    ABS. LYMPHOCYTES 1.3 0.9 - 3.6 K/UL    ABS. MONOCYTES 0.7 0.05 - 1.2 K/UL    ABS. EOSINOPHILS 0.2 0.0 - 0.4 K/UL    ABS.  BASOPHILS 0.0 0.0 - 0.1 K/UL    DF AUTOMATED     METABOLIC PANEL, BASIC    Collection Time: 08/14/21  9:50 PM   Result Value Ref Range    Sodium 139 136 - 145 mmol/L    Potassium 3.8 3.5 - 5.5 mmol/L    Chloride 108 100 - 111 mmol/L    CO2 27 21 - 32 mmol/L    Anion gap 4 3.0 - 18 mmol/L    Glucose 98 74 - 99 mg/dL    BUN 12 7.0 - 18 MG/DL    Creatinine 1.13 0.6 - 1.3 MG/DL BUN/Creatinine ratio 11 (L) 12 - 20      GFR est AA >60 >60 ml/min/1.73m2    GFR est non-AA >60 >60 ml/min/1.73m2    Calcium 9.2 8.5 - 10.1 MG/DL   URINE MICROSCOPIC ONLY    Collection Time: 08/14/21  9:50 PM   Result Value Ref Range    WBC 0 to 3 0 - 5 /hpf    RBC TOO NUMEROUS TO COUNT 0 - 5 /hpf    Epithelial cells Negative 0 - 5 /lpf    Bacteria Negative NEG /hpf    Other:        Macroscopic performed on spun urine due to gross blood  or mucus       Radiologic Studies -   No orders to display     CT Results  (Last 48 hours)    None        CXR Results  (Last 48 hours)    None          Medications given in the ED-  Medications   trimethoprim-sulfamethoxazole (BACTRIM DS, SEPTRA DS) 160-800 mg per tablet 1 Tablet (has no administration in time range)         Medical Decision Making   I am the first provider for this patient. I reviewed the vital signs, available nursing notes, past medical history, past surgical history, family history and social history. Vital Signs-Reviewed the patient's vital signs. Records Reviewed: Nursing Notes      Procedures:  Procedures    ED Course:  9:01 PM   Initial assessment performed. The patients presenting problems have been discussed, and they are in agreement with the care plan formulated and outlined with them. I have encouraged them to ask questions as they arise throughout their visit. Provider Notes (Medical Decision Making): Cristina Lefort is a 59 y.o. male presents with gross hematuria x1 day, no associated pain or evidence of urinary obstruction, retention. His vitals are stable, abdomen benign. His labs including H&H and renal function are normal and urinalysis +blood but also +nitrite, so raises concern for infection. Will start on Bactrim. No further intervention needed tonight, significant blood loss or urinary retention/obstruction so will defer to patient's urologist as already scheduled on Thursday.   Advise he should call Monday to possibly be seen sooner and RTED if any pain or inability to urinate. Diagnosis and Disposition       DISCHARGE NOTE:    Nissa Finley  results have been reviewed with him. He has been counseled regarding his diagnosis, treatment, and plan. He verbally conveys understanding and agreement of the signs, symptoms, diagnosis, treatment and prognosis and additionally agrees to follow up as discussed. He also agrees with the care-plan and conveys that all of his questions have been answered. I have also provided discharge instructions for him that include: educational information regarding their diagnosis and treatment, and list of reasons why they would want to return to the ED prior to their follow-up appointment, should his condition change. He has been provided with education for proper emergency department utilization. CLINICAL IMPRESSION:    1. Acute cystitis with hematuria    2. Gross hematuria        PLAN:  1. D/C Home  2. Current Discharge Medication List      START taking these medications    Details   trimethoprim-sulfamethoxazole (Bactrim DS) 160-800 mg per tablet Take 1 Tablet by mouth two (2) times a day for 10 days. Qty: 20 Tablet, Refills: 0  Start date: 8/14/2021, End date: 8/24/2021           3. Follow-up Information     Follow up With Specialties Details Why Anai Keating MD Urology  on 8/19/21 as scheduled [facility] [id number] [phone]      [facility] EMERGENCY DEPT Emergency Medicine  As needed, If symptoms worsen [address] [address]  [phone]        _______________________________      Please note that this dictation was completed with [employer], the [employer] voice recognition software. Quite often unanticipated grammatical, syntax, homophones, and other interpretive errors are inadvertently transcribed by the computer software. Please disregard these errors. Please excuse any errors that have escaped final proofreading.

## 2021-08-16 LAB
BACTERIA SPEC CULT: NORMAL
SERVICE CMNT-IMP: NORMAL

## 2021-08-19 ENCOUNTER — TRANSCRIBE ORDER (OUTPATIENT)
Dept: SCHEDULING | Age: 61
End: 2021-08-19

## 2021-08-19 DIAGNOSIS — R31.0 GROSS HEMATURIA: Primary | ICD-10-CM

## 2021-08-20 ENCOUNTER — HOSPITAL ENCOUNTER (OUTPATIENT)
Dept: PREADMISSION TESTING | Age: 61
Discharge: HOME | End: 2021-08-20
Payer: OTHER GOVERNMENT

## 2021-08-20 ENCOUNTER — TRANSCRIBE ORDER (OUTPATIENT)
Dept: REGISTRATION | Age: 61
End: 2021-08-20

## 2021-08-20 DIAGNOSIS — R31.0 GROSS HEMATURIA: Primary | ICD-10-CM

## 2021-08-20 DIAGNOSIS — R31.0 GROSS HEMATURIA: ICD-10-CM

## 2021-08-20 LAB
ATRIAL RATE: 93 BPM
CALCULATED P AXIS, ECG09: 40 DEGREES
CALCULATED R AXIS, ECG10: 27 DEGREES
CALCULATED T AXIS, ECG11: 30 DEGREES
DIAGNOSIS, 93000: NORMAL
P-R INTERVAL, ECG05: 180 MS
Q-T INTERVAL, ECG07: 362 MS
QRS DURATION, ECG06: 74 MS
QTC CALCULATION (BEZET), ECG08: 450 MS
VENTRICULAR RATE, ECG03: 93 BPM

## 2021-08-20 PROCEDURE — 93005 ELECTROCARDIOGRAM TRACING: CPT

## 2021-08-20 PROCEDURE — U0005 INFEC AGEN DETEC AMPLI PROBE: HCPCS

## 2021-08-21 LAB — SARS-COV-2, COV2NT: NOT DETECTED

## 2021-08-23 ENCOUNTER — HOSPITAL ENCOUNTER (OUTPATIENT)
Dept: PREADMISSION TESTING | Age: 61
Discharge: HOME | End: 2021-08-23

## 2021-08-23 VITALS — BODY MASS INDEX: 35.49 KG/M2 | HEIGHT: 72 IN | WEIGHT: 262 LBS

## 2021-08-23 RX ORDER — SODIUM CHLORIDE, SODIUM LACTATE, POTASSIUM CHLORIDE, CALCIUM CHLORIDE 600; 310; 30; 20 MG/100ML; MG/100ML; MG/100ML; MG/100ML
125 INJECTION, SOLUTION INTRAVENOUS CONTINUOUS
Status: CANCELLED | OUTPATIENT
Start: 2021-08-23

## 2021-08-23 RX ORDER — CHOLECALCIFEROL (VITAMIN D3) 125 MCG
10 CAPSULE ORAL
COMMUNITY

## 2021-08-23 RX ORDER — CEFAZOLIN SODIUM/WATER 2 G/20 ML
2 SYRINGE (ML) INTRAVENOUS ONCE
Status: CANCELLED | OUTPATIENT
Start: 2021-08-23 | End: 2021-08-23

## 2021-08-23 RX ORDER — METOPROLOL SUCCINATE 25 MG/1
25 TABLET, EXTENDED RELEASE ORAL
COMMUNITY

## 2021-08-23 RX ORDER — LEUPROLIDE ACETATE 45 MG
45 KIT INTRAMUSCULAR ONCE
COMMUNITY

## 2021-08-23 NOTE — PERIOP NOTES
Operative consent filled out according to MD order on surgical posting, verbatim. No DNR. During PAT interview, patient advised to self quarantine 3 days prior to DOS. Patient had Covid 19 test on 08/20/2021. Patient instructed as part of pre-op teaching not to bring any valuables including Wallet, jewelry, cell phone, lap top or tablet. Patient also instructed not to wear anything on skin including deodorant, cologne, creams or sprays. Patient confirmed receipt of CHG skin preparation and instructions. Patient is aware of one visitor in surgical pavilion.

## 2021-08-24 ENCOUNTER — ANESTHESIA (OUTPATIENT)
Dept: SURGERY | Age: 61
End: 2021-08-24
Payer: OTHER GOVERNMENT

## 2021-08-24 ENCOUNTER — ANESTHESIA EVENT (OUTPATIENT)
Dept: SURGERY | Age: 61
End: 2021-08-24
Payer: OTHER GOVERNMENT

## 2021-08-24 ENCOUNTER — HOSPITAL ENCOUNTER (OUTPATIENT)
Age: 61
Setting detail: OUTPATIENT SURGERY
Discharge: HOME | End: 2021-08-24
Attending: UROLOGY | Admitting: UROLOGY
Payer: OTHER GOVERNMENT

## 2021-08-24 VITALS
OXYGEN SATURATION: 95 % | RESPIRATION RATE: 16 BRPM | HEIGHT: 72 IN | HEART RATE: 85 BPM | BODY MASS INDEX: 35.15 KG/M2 | SYSTOLIC BLOOD PRESSURE: 114 MMHG | TEMPERATURE: 97.2 F | WEIGHT: 259.5 LBS | DIASTOLIC BLOOD PRESSURE: 73 MMHG

## 2021-08-24 DIAGNOSIS — R31.0 GROSS HEMATURIA: Primary | ICD-10-CM

## 2021-08-24 PROCEDURE — 77030018831 HC SOL IRR H20 BAXT -A: Performed by: UROLOGY

## 2021-08-24 PROCEDURE — 74011250636 HC RX REV CODE- 250/636: Performed by: UROLOGY

## 2021-08-24 PROCEDURE — 77030040361 HC SLV COMPR DVT MDII -B: Performed by: UROLOGY

## 2021-08-24 PROCEDURE — 76210000063 HC OR PH I REC FIRST 0.5 HR: Performed by: UROLOGY

## 2021-08-24 PROCEDURE — 77030012508 HC MSK AIRWY LMA AMBU -A: Performed by: ANESTHESIOLOGY

## 2021-08-24 PROCEDURE — 76210000020 HC REC RM PH II FIRST 0.5 HR: Performed by: UROLOGY

## 2021-08-24 PROCEDURE — 76060000032 HC ANESTHESIA 0.5 TO 1 HR: Performed by: UROLOGY

## 2021-08-24 PROCEDURE — 2709999900 HC NON-CHARGEABLE SUPPLY: Performed by: UROLOGY

## 2021-08-24 PROCEDURE — 76010000138 HC OR TIME 0.5 TO 1 HR: Performed by: UROLOGY

## 2021-08-24 PROCEDURE — 74011000250 HC RX REV CODE- 250: Performed by: NURSE ANESTHETIST, CERTIFIED REGISTERED

## 2021-08-24 PROCEDURE — 88307 TISSUE EXAM BY PATHOLOGIST: CPT

## 2021-08-24 PROCEDURE — 77030020782 HC GWN BAIR PAWS FLX 3M -B: Performed by: UROLOGY

## 2021-08-24 PROCEDURE — 88309 TISSUE EXAM BY PATHOLOGIST: CPT

## 2021-08-24 PROCEDURE — 74011250636 HC RX REV CODE- 250/636: Performed by: NURSE ANESTHETIST, CERTIFIED REGISTERED

## 2021-08-24 RX ORDER — FENTANYL CITRATE 50 UG/ML
50 INJECTION, SOLUTION INTRAMUSCULAR; INTRAVENOUS
Status: DISCONTINUED | OUTPATIENT
Start: 2021-08-24 | End: 2021-08-24 | Stop reason: HOSPADM

## 2021-08-24 RX ORDER — LIDOCAINE HYDROCHLORIDE 20 MG/ML
INJECTION, SOLUTION EPIDURAL; INFILTRATION; INTRACAUDAL; PERINEURAL AS NEEDED
Status: DISCONTINUED | OUTPATIENT
Start: 2021-08-24 | End: 2021-08-24 | Stop reason: HOSPADM

## 2021-08-24 RX ORDER — PROPOFOL 10 MG/ML
INJECTION, EMULSION INTRAVENOUS AS NEEDED
Status: DISCONTINUED | OUTPATIENT
Start: 2021-08-24 | End: 2021-08-24 | Stop reason: HOSPADM

## 2021-08-24 RX ORDER — CEFAZOLIN SODIUM/WATER 2 G/20 ML
2 SYRINGE (ML) INTRAVENOUS ONCE
Status: COMPLETED | OUTPATIENT
Start: 2021-08-24 | End: 2021-08-24

## 2021-08-24 RX ORDER — NALOXONE HYDROCHLORIDE 0.4 MG/ML
0.4 INJECTION, SOLUTION INTRAMUSCULAR; INTRAVENOUS; SUBCUTANEOUS AS NEEDED
Status: DISCONTINUED | OUTPATIENT
Start: 2021-08-24 | End: 2021-08-24 | Stop reason: HOSPADM

## 2021-08-24 RX ORDER — HYDROMORPHONE HYDROCHLORIDE 1 MG/ML
INJECTION, SOLUTION INTRAMUSCULAR; INTRAVENOUS; SUBCUTANEOUS AS NEEDED
Status: DISCONTINUED | OUTPATIENT
Start: 2021-08-24 | End: 2021-08-24 | Stop reason: HOSPADM

## 2021-08-24 RX ORDER — DEXAMETHASONE SODIUM PHOSPHATE 4 MG/ML
INJECTION, SOLUTION INTRA-ARTICULAR; INTRALESIONAL; INTRAMUSCULAR; INTRAVENOUS; SOFT TISSUE AS NEEDED
Status: DISCONTINUED | OUTPATIENT
Start: 2021-08-24 | End: 2021-08-24 | Stop reason: HOSPADM

## 2021-08-24 RX ORDER — FLUMAZENIL 0.1 MG/ML
0.2 INJECTION INTRAVENOUS
Status: DISCONTINUED | OUTPATIENT
Start: 2021-08-24 | End: 2021-08-24 | Stop reason: HOSPADM

## 2021-08-24 RX ORDER — CEPHALEXIN 500 MG/1
500 CAPSULE ORAL 2 TIMES DAILY
Qty: 6 CAPSULE | Refills: 0 | Status: SHIPPED | OUTPATIENT
Start: 2021-08-24 | End: 2021-08-27

## 2021-08-24 RX ORDER — SODIUM CHLORIDE, SODIUM LACTATE, POTASSIUM CHLORIDE, CALCIUM CHLORIDE 600; 310; 30; 20 MG/100ML; MG/100ML; MG/100ML; MG/100ML
125 INJECTION, SOLUTION INTRAVENOUS CONTINUOUS
Status: DISCONTINUED | OUTPATIENT
Start: 2021-08-24 | End: 2021-08-24 | Stop reason: HOSPADM

## 2021-08-24 RX ORDER — PHENYLEPHRINE HCL IN 0.9% NACL 1 MG/10 ML
SYRINGE (ML) INTRAVENOUS AS NEEDED
Status: DISCONTINUED | OUTPATIENT
Start: 2021-08-24 | End: 2021-08-24 | Stop reason: HOSPADM

## 2021-08-24 RX ORDER — OXYCODONE AND ACETAMINOPHEN 5; 325 MG/1; MG/1
1 TABLET ORAL
Status: DISCONTINUED | OUTPATIENT
Start: 2021-08-24 | End: 2021-08-24 | Stop reason: HOSPADM

## 2021-08-24 RX ORDER — ONDANSETRON 2 MG/ML
INJECTION INTRAMUSCULAR; INTRAVENOUS AS NEEDED
Status: DISCONTINUED | OUTPATIENT
Start: 2021-08-24 | End: 2021-08-24 | Stop reason: HOSPADM

## 2021-08-24 RX ORDER — HYDROMORPHONE HYDROCHLORIDE 1 MG/ML
0.5 INJECTION, SOLUTION INTRAMUSCULAR; INTRAVENOUS; SUBCUTANEOUS
Status: DISCONTINUED | OUTPATIENT
Start: 2021-08-24 | End: 2021-08-24 | Stop reason: HOSPADM

## 2021-08-24 RX ORDER — SODIUM CHLORIDE, SODIUM LACTATE, POTASSIUM CHLORIDE, CALCIUM CHLORIDE 600; 310; 30; 20 MG/100ML; MG/100ML; MG/100ML; MG/100ML
100 INJECTION, SOLUTION INTRAVENOUS CONTINUOUS
Status: DISCONTINUED | OUTPATIENT
Start: 2021-08-24 | End: 2021-08-24 | Stop reason: HOSPADM

## 2021-08-24 RX ORDER — GLYCOPYRROLATE 0.2 MG/ML
INJECTION INTRAMUSCULAR; INTRAVENOUS AS NEEDED
Status: DISCONTINUED | OUTPATIENT
Start: 2021-08-24 | End: 2021-08-24 | Stop reason: HOSPADM

## 2021-08-24 RX ORDER — MIDAZOLAM HYDROCHLORIDE 1 MG/ML
INJECTION, SOLUTION INTRAMUSCULAR; INTRAVENOUS AS NEEDED
Status: DISCONTINUED | OUTPATIENT
Start: 2021-08-24 | End: 2021-08-24 | Stop reason: HOSPADM

## 2021-08-24 RX ORDER — ONDANSETRON 2 MG/ML
4 INJECTION INTRAMUSCULAR; INTRAVENOUS ONCE
Status: DISCONTINUED | OUTPATIENT
Start: 2021-08-24 | End: 2021-08-24 | Stop reason: HOSPADM

## 2021-08-24 RX ORDER — FENTANYL CITRATE 50 UG/ML
INJECTION, SOLUTION INTRAMUSCULAR; INTRAVENOUS AS NEEDED
Status: DISCONTINUED | OUTPATIENT
Start: 2021-08-24 | End: 2021-08-24 | Stop reason: HOSPADM

## 2021-08-24 RX ADMIN — FENTANYL CITRATE 50 MCG: 50 INJECTION, SOLUTION INTRAMUSCULAR; INTRAVENOUS at 10:24

## 2021-08-24 RX ADMIN — DEXAMETHASONE SODIUM PHOSPHATE 8 MG: 4 INJECTION, SOLUTION INTRAMUSCULAR; INTRAVENOUS at 10:08

## 2021-08-24 RX ADMIN — Medication 100 MCG: at 10:15

## 2021-08-24 RX ADMIN — ONDANSETRON HYDROCHLORIDE 4 MG: 2 INJECTION INTRAMUSCULAR; INTRAVENOUS at 10:08

## 2021-08-24 RX ADMIN — FENTANYL CITRATE 50 MCG: 50 INJECTION, SOLUTION INTRAMUSCULAR; INTRAVENOUS at 10:04

## 2021-08-24 RX ADMIN — SODIUM CHLORIDE, SODIUM LACTATE, POTASSIUM CHLORIDE, AND CALCIUM CHLORIDE 125 ML/HR: 600; 310; 30; 20 INJECTION, SOLUTION INTRAVENOUS at 08:40

## 2021-08-24 RX ADMIN — LIDOCAINE HYDROCHLORIDE 100 MG: 20 INJECTION, SOLUTION INTRAVENOUS at 10:00

## 2021-08-24 RX ADMIN — MIDAZOLAM 2 MG: 1 INJECTION INTRAMUSCULAR; INTRAVENOUS at 09:55

## 2021-08-24 RX ADMIN — Medication 100 MCG: at 10:09

## 2021-08-24 RX ADMIN — GLYCOPYRROLATE 0.1 MG: 0.2 INJECTION INTRAMUSCULAR; INTRAVENOUS at 09:55

## 2021-08-24 RX ADMIN — CEFAZOLIN 2 G: 1 INJECTION, POWDER, FOR SOLUTION INTRAVENOUS at 10:07

## 2021-08-24 RX ADMIN — Medication 100 MCG: at 10:03

## 2021-08-24 RX ADMIN — PROPOFOL 200 MG: 10 INJECTION, EMULSION INTRAVENOUS at 10:00

## 2021-08-24 RX ADMIN — HYDROMORPHONE HYDROCHLORIDE 1 MG: 1 INJECTION, SOLUTION INTRAMUSCULAR; INTRAVENOUS; SUBCUTANEOUS at 09:55

## 2021-08-24 NOTE — BRIEF OP NOTE
Brief Postoperative Note    Patient: Ashish Mancuso  YOB: 1960  MRN: [id number]    Date of Procedure: 8/24/2021     Pre-Op Diagnosis: GROSS HEMATURIA    Post-Op Diagnosis:  GROSS HEMATURIA, NEOPLASM OF BLADDER OF UNSPECIFIED BEHAVIOR      Procedure(s):  CYSTOSCOPY, TRANSURETHRAL RESECTION OF BLADDER TUMOR (SMALL),     Surgeon(s):  Lazara Jordan MD    Surgical Assistant: NONE    Anesthesia: GENERAL    Estimated Blood Loss (mL): Minimal    Complications: None    Specimens:   ID Type Source Tests Collected by Time Destination   1 : BLADDER TUMOR Preservative Bladder  Lazara Jordan MD 8/24/2021 1029 Pathology        Implants: * No implants in log *    Drains: * No LDAs found *    Findings: 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

## 2021-08-24 NOTE — PERIOP NOTES
Reviewed PTA medication list with patient/caregiver and patient/caregiver denies any additional medications. Patient admits to having a responsible adult care for them at home for at least 24 hours after surgery. Patient encouraged to use gown warming system and informed that using said warming gown to regulate body temperature prior to a procedure has been shown to help reduce the risks of blood clots and infection. Patient's pharmacy of choice verified and documented in PTA medication section. Dual skin assessment & fall risk band verification completed with Gomez Villela RN.

## 2021-08-24 NOTE — H&P
Urology [facility]  [address] [place] [address]  Tel: [phone]  Fax: [phone]    Patient : Franky Eugene   YOB: 1960           Assessment/Plan  # Detail Type Description    1. Assessment Feeling of incomplete bladder emptying (R39.14). 2. Assessment Gross hematuria (R31.0). Patient Plan He has very significant hematuria. We discussed this could represent a benign or malignant condition. The urine is so thick a flexible cysto in the office would not be an option. We will do a cysto, clot evacuation and possible TURBT or possible TURP to control bleeding. Risk of bleeding, infection, injury and possible need for future procedures was discussed. He will proceed. Given the fact that he has had 3 other cancers in his life I do worry that bladder or kidney cancer could be a possibility. We will also check a 3 phase CT. Plan Orders Further diagnostic evaluations ordered today include(s) CT Abdomen And Pelvis W/and W/O Contrast (Per Dx- No Oral Or W/Oral Contrast) to be performed. 3. Assessment Cancer of prostate (C61). Patient Plan He is doing well. He has no evidence of disease. His testosterone is gradually increasing. He will be due for another T level and PSA in 6mo. 4. Assessment Enlarged prostate w/ LUTS (N40.1). Patient Plan Overall he's really not bothered enough that he wants to start Flomax again. We will reassess how things are after he has his hematuria eval completed. 5. Assessment Poor urinary stream (R39.12). Patient Plan He's doing okay. 6. Assessment Urgency of urination (R39.15). Patient Plan No need for Tolterodine or Trospium right now. 7. Assessment Flushing (R23.2). Patient Plan This is getting better              Additional Visit Information      This 59year old male presents for Hematuria, Prostate Cancer, Erectile Dysfunction and BPH. History of Present Illness  1. Hematuria   The patient presents with hematuria (gross with clots). The problem began 1 week ago. The symptoms are constant. The problem is unchanged. The patient denies pain. Pertinent history includes being at least 34years of age but not history of UTIs or prior prostate surgeries. The patient has not had any prior screenings. The patient denies chills, fever, nausea and vomiting. Additional information: No flank or suprapubic pain. He is able to void but the urine is fairly thick. No fevers. No dysuria. 2.  Prostate Cancer   The patient is here today for scheduled follow up. The patient's status is without evidence of disease. The patient's recent Turner score was 4+4. The patient's highest Turner score was 4+4. The patient has had the following treatment: radiation therapy (external beam radiation (IMRT) on 05/01/2019). The patient  is experiencing nocturia, urinary incontinence and urinary urgency but denies chills, diarrhea, a fever, headache, nausea, sexual dysfunction or vomiting. Pertinent history does not include a family history of prostate cancer. Additional information: 2017 MR guided biopsy at [facility]: no prostate anomalies on MRI and but 1 core showed Turner 3+4 or 4+3 prostate cancer (difficult to tell by report) in  a small volume of specimen. He was found to have lymphoma and is now in remission. He underwent restaging biopsy 12/13/18: Turner 4+4=8 CaP involving LEFT apex and target Lesion LEFT [place]    He finished radiation at [facility] and had 1st lupron 1/29/19. Still with hot flashes but he is not bothered enough to want meds. Energy is poor. PSA=0.010 and T=57 (2/2020)    2/2020 -- he complains of continued hot flashes which are a little worse. He is still not interested in medications for these at this point. He also complains of a lot of muscle cramps all over his body. It is unclear what the cause of these cramps are.    8/2020 -- He is doing well.    PSA<0.008 He has fatigue and is some what bothered. 2/2021 -- he has completed 2 years of hormones and radiation. T=36 and PSA < 0.008. He still has hot flashes. He still has some depression, but he is getting better. 8/19/2021 -- PSA = 0.008 and T=67   hot flashes are minimal.  Still has some depression    3. Erectile Dysfunction   The symptoms began gradually, have been severe and are worse. The patient is here today for a follow up visit. The patient states he does have difficultly attaining an erection and has difficulty maintaining an erection. Pertinent history does not include diabetes or neurologic disease. The patient denies depression. Additional information: poor libido.   + fatigue. poor concentration,  + hot flashes. Decreased enjoyment life, easily irritated. Viagra and cialis worked great in the past, but everything is ineffective now.    T=524 (1/11/17)    398 (3/29/17)  T=57  on lupron (2/2020)    2/2021 -- He has no libido. 4.  BPH   Onset was gradual. Severity level is moderate. It occurs daily. The problem is worse. Associated symptoms include incomplete emptying, nocturia (2 times per night), urgency and urinary incontinence (urgency). Pertinent negatives include chills, constipation and fever. Additional information: 8/9/19 -- He has more urgency and and had a couple of episoides of urge incontinence. 2/2020 -- he is on Flomax and is improved significantly. His flow is stronger and nocturia is less. No side effects. .            8/2020 -- he is doing okay. He stopped Flomax and notes only a little worsening of symptoms. He is not interested in restarting again. 2/2021 -- He is doing worse. flow is weak. Nocturia x 1. He has some urgency and i5pyuraedzh urge incontinence. He has some rectal urgency as well  . [id number].    8/19/2021 -- his flow is a little worse.   Certainly the hematuria has made it difficult due to the viscosity      Past Medical/Surgical History   (Reviewed, updated)  Disease/disorder Onset Date Management Date Comments   Cancer, prostate 2017        Trigger finger Release 2016    Prostatitis       Cancer, lymphoma       Hypertension       Sleep apnea           Problem List  Problem List reviewed. Problem Description Onset Date Chronic Clinical Status Notes   Acquired trigger finger 2015 N     Body mass index 30+ - obesity 2017 N       Medications (active prior to today)  Medication Instructions Start Date Stop Date Refilled Elsewhere   Lupron Depot (6 Month) 45 mg intramuscular syringe kit  / 2021  Y   METOPROLOL SUCCINATE ER TABS 25MG TAKE 1 TABLET DAILY 2021 N       Medication Reconciliation  Medications reconciled today. Medication Reviewed  Adherence Medication Name Sig Desc Elsewhere Status   taking as directed METOPROLOL SUCCINATE ER TABS 25MG TAKE 1 TABLET DAILY N Verified     Allergies  Ingredient Reaction (Severity) Medication Name Comment   NO KNOWN ALLERGIES        Reviewed, no changes. Family History   (Reviewed, updated)    Relationship Family Member Name  Age at Death Condition Onset Age Cause of Death   Family h/o    Cancer - prostate     Father  Y 80 Diabetes mellitus  N   Mother  Y 58 Cancer  N     Social History  (Reviewed, updated)  Tobacco use reviewed. Preferred language is [place]. The patient does not need an . Marital Status/Family/Social Support  Marital status:      Smoking status: Never smoker. Tobacco Screening  Patient has never used tobacco. Patient has not used tobacco in the last 30 days. Patient has not used smokeless tobacco in the last 30 days. Smoking Status  Type Smoking Status Usage Per Day Years Used Pack Years Total Pack Years    Never smoker         Tobacco/Vaping Exposure  No passive smoke exposure. Alcohol  There is a history of alcohol use. consumed daily.     Caffeine  The patient uses caffeine: coffee. Review of Systems  System Neg/Pos Details   Constitutional Negative Chills, Fever and Pain. ENMT Negative Ear infections and Sore throat. Eyes Negative Blurred vision, Double vision and Eye pain. Respiratory Negative Asthma, Chronic cough, Dyspnea and Wheezing. Cardio Negative Chest pain. GI Negative Constipation, Decreased appetite, Diarrhea, Nausea and Vomiting.  Positive Incomplete emptying, Nocturia, Urgency, Urinary incontinence. Endocrine Negative Cold intolerance, Heat intolerance, Increased thirst and Weight loss. Neuro Negative Headache and Tremors. Psych Negative Anxiety and Depression. Integumentary Negative Itching skin and Rash. MS Negative Back pain and Joint pain. Hema/Lymph Negative Easy bleeding. Reproductive Negative Sexual dysfunction. Vital Signs   Height  Time ft in cm Last Measured Height Position   11:51 AM 6.0 0.00 182.88 01/02/2020 Standing     Weight/BSA/BMI  Time lb oz kg Context BMI kg/m2 BSA m2   11:51 .00  73.482  21.97      Measured by  Time Measured by   11:51 AM Patrick Edwards     Physical Exam  Exam Findings Details   Constitutional Normal Well developed. Neck Exam Normal Inspection - Normal.   Respiratory Normal Inspection - Normal.   Abdomen Normal No abdominal tenderness. Genitourinary Normal No CVA tenderness. No suprapubic tenderness. Extremity Normal No edema. Psychiatric Normal Orientation - Oriented to time, place, person & situation. Appropriate mood and affect. Uroflow  Feeling of incomplete bladder emptying R39.14. Procedure   Sonographic residual urine: 162mL. Time after void: 5 Minutes. Comments:. Physician: Tasha Molina MD. Date: 08/19/2021. Time: 11:52 AM.    Post procedure  Instructions: Margo Rued         Medications (added, continued, or stopped today)  Start Date Medication Directions PRN Status PRN Reason Instruction Stop Date    Lupron Depot (6 Month) 45 mg intramuscular syringe kit N   08/19/2021 02/17/2021 METOPROLOL SUCCINATE ER TABS 25MG TAKE 1 TABLET DAILY N          Active Patient Care Team Members  Name Contact Agency Type Support Role Relationship Active Date Inactive Date Specialty   Didi Sanchez   encounter provider    Pulmonary Medicine   Leigh Don   encounter provider    Urology   Nataliia Ralph   Patient provider PCP   Internal Medicine   Nataliia Ralph   primary care provider    Internal Medicine   Yamil Expose   encounter provider    Pulmonary Medicine       Provider:        Emily Pineda MD

## 2021-08-24 NOTE — PERIOP NOTES
Discharge instructions given to patient and patient's family member. Opportunities for questions provided.

## 2021-08-24 NOTE — ANESTHESIA POSTPROCEDURE EVALUATION
Post-Anesthesia Evaluation & Assessment    Visit Vitals  /73   Pulse 85   Temp 36.2 °C (97.2 °F)   Resp 16   Ht 6' (1.829 m)   Wt 117.7 kg (259 lb 8 oz)   SpO2 95%   BMI 35.19 kg/m²       Nausea/Vomiting: Controlled. Post-operative hydration adequate. Pain Scale 1: Numeric (0 - 10) (08/24/21 1145)  Pain Intensity 1: 0 (08/24/21 1145)   Managed    Pain score at or below stated goal level. Mental status & Level of consciousness: alert and oriented x 3    Neurological status: moves all extremities, sensation grossly intact    Pulmonary status: airway patent, adequate oxygenation. Complications related to anesthesia: none    Patient has met all PACU discharge requirements.       Kris Arviuz DO

## 2021-08-24 NOTE — DISCHARGE INSTRUCTIONS
DISCHARGE SUMMARY from Nurse    PATIENT INSTRUCTIONS:    After general anesthesia or intravenous sedation, for 24 hours or while taking prescription Narcotics:  · Limit your activities  · Do not drive and operate hazardous machinery  · Do not make important personal or business decisions   · Do  not drink alcoholic beverages  · If you have not urinated within 8 hours after discharge, please contact your surgeon on call. Report the following to your surgeon:  · Excessive pain, swelling, redness or odor of or around the surgical area  · Temperature over 100.5  · Nausea and vomiting lasting longer than 4 hours or if unable to take medications  · Any signs of decreased circulation or nerve impairment to extremity: change in color, persistent  numbness, tingling, coldness or increase pain  · Any questions    What to do at Home:  Recommended activity: Activity as tolerated, no driving for today, ambulate in house, drink plenty of water    If you experience any of the following symptoms - unable to urinate in 8 hours or passing large blood clots, please follow up with Dr. Colby Hobson. *  Please give a list of your current medications to your Primary Care Provider. *  Please update this list whenever your medications are discontinued, doses are      changed, or new medications (including over-the-counter products) are added. *  Please carry medication information at all times in case of emergency situations. These are general instructions for a healthy lifestyle:    No smoking/ No tobacco products/ Avoid exposure to second hand smoke  Surgeon General's Warning:  Quitting smoking now greatly reduces serious risk to your health.     Obesity, smoking, and sedentary lifestyle greatly increases your risk for illness    A healthy diet, regular physical exercise & weight monitoring are important for maintaining a healthy lifestyle    You may be retaining fluid if you have a history of heart failure or if you experience any of the following symptoms:  Weight gain of 3 pounds or more overnight or 5 pounds in a week, increased swelling in our hands or feet or shortness of breath while lying flat in bed. Please call your doctor as soon as you notice any of these symptoms; do not wait until your next office visit. The discharge information has been reviewed with the patient and caregiver. The patient and caregiver verbalized understanding. Discharge medications reviewed with the patient and caregiver and appropriate educational materials and side effects teaching were provided. ___________________________________________________________________________________________________________________________________       Learning About Coronavirus ([id number]-60)  What is coronavirus (COVID-19)? COVID-19 is a disease caused by a new type of coronavirus. This illness was first found in December 2019. It has since spread worldwide. Coronaviruses are a large group of viruses. They cause the common cold. They also cause more serious illnesses like Middle East respiratory syndrome (MERS) and severe acute respiratory syndrome (SARS). COVID-19 is caused by a novel coronavirus. That means it's a new type that has not been seen in people before. What are the symptoms? Coronavirus (COVID-19) symptoms may include:  · Fever. · Cough. · Trouble breathing. · Chills or repeated shaking with chills. · Muscle pain. · Headache. · Sore throat. · New loss of taste or smell. · Vomiting. · Diarrhea. In severe cases, COVID-19 can cause pneumonia and make it hard to breathe without help from a machine. It can cause death. How is it diagnosed? COVID-19 is diagnosed with a viral test. This may also be called a PCR test or antigen test. It looks for evidence of the virus in your breathing passages or lungs (respiratory system). The test is most often done on a sample from the nose, throat, or lungs. It's sometimes done on a sample of saliva. One way a sample is collected is by putting a long swab into the back of your nose. How is it treated? Mild cases of COVID-19 can be treated at home. Serious cases need treatment in the hospital. Treatment may include medicines to reduce symptoms, plus breathing support such as oxygen therapy or a ventilator. Some people may be placed on their belly to help their oxygen levels. Treatments that may help people who have COVID-19 include:  Antiviral medicines. These medicines treat viral infections. Remdesivir is an example. Immune-based therapy. These medicines help the immune system fight COVID-19. One example is bamlanivimab. It's a monoclonal antibody. Blood thinners. These medicines help prevent blood clots. People with severe illness are at risk for blood clots. How can you protect yourself and others? The best way to protect yourself from getting sick is to:  · Avoid areas where there is an outbreak. · Avoid contact with people who may be infected. · Avoid crowds and try to stay at least 6 feet away from other people. · Wash your hands often, especially after you cough or sneeze. Use soap and water, and scrub for at least 20 seconds. If soap and water aren't available, use an alcohol-based hand . · Avoid touching your mouth, nose, and eyes. To help avoid spreading the virus to others:  · Stay home if you are sick or have been exposed to the virus. Don't go to school, work, or public areas. And don't use public transportation, ride-shares, or taxis unless you have no choice. · Wear a cloth face cover if you have to go to public areas. · Cover your mouth with a tissue when you cough or sneeze. Then throw the tissue in the trash and wash your hands right away. · If you're sick:  ? Leave your home only if you need to get medical care. But call the doctor's office first so they know you're coming. And wear a face cover. ? Wear the face cover whenever you're around other people.  It can help stop the spread of the virus when you cough or sneeze. ? Limit contact with pets and people in your home. If possible, stay in a separate bedroom and use a separate bathroom. ? Clean and disinfect your home every day. Use household  and disinfectant wipes or sprays. Take special care to clean things that you grab with your hands. These include doorknobs, remote controls, phones, and handles on your refrigerator and microwave. And don't forget countertops, tabletops, bathrooms, and computer keyboards. When should you call for help? Call 911 anytime you think you may need emergency care. For example, call if you have life-threatening symptoms, such as:    · You have severe trouble breathing. (You can't talk at all.)     · You have constant chest pain or pressure.     · You are severely dizzy or lightheaded.     · You are confused or can't think clearly.     · Your face and lips have a blue color.     · You pass out (lose consciousness) or are very hard to wake up. Call your doctor now or seek immediate medical care if:    · You have moderate trouble breathing. (You can't speak a full sentence.)     · You are coughing up blood (more than about 1 teaspoon).     · You have signs of low blood pressure. These include feeling lightheaded; being too weak to stand; and having cold, pale, clammy skin. Watch closely for changes in your health, and be sure to contact your doctor if:    · Your symptoms get worse.     · You are not getting better as expected. Call before you go to the doctor's office. Follow their instructions. And wear a cloth face cover. Current as of: December 18, 2020               Content Version: 12.8  © 2415-5942 [employer]. Care instructions adapted under license by [employer] (which disclaims liability or warranty for this information).  If you have questions about a medical condition or this instruction, always ask your healthcare professional. [employer] disclaims any warranty or liability for your use of this information.

## 2021-08-24 NOTE — PERIOP NOTES
Paged Dr. Demario Poon for patient sign out. Patient meets criteria for transfer to the next phase of care.

## 2021-08-24 NOTE — ANESTHESIA PREPROCEDURE EVALUATION
Relevant Problems   NEUROLOGY   (+) Alcohol abuse      CARDIOVASCULAR   (+) Essential hypertension      PERSONAL HX & FAMILY HX OF CANCER   (+) Malignant lymphoma, large B-cell, diffuse (HCC)   (+) Prostate cancer (HCC)       Anesthetic History   No history of anesthetic complications            Review of Systems / Medical History  Patient summary reviewed, nursing notes reviewed and pertinent labs reviewed    Pulmonary        Sleep apnea           Neuro/Psych   Within defined limits           Cardiovascular    Hypertension              Exercise tolerance: >4 METS     GI/Hepatic/Renal  Within defined limits              Endo/Other  Within defined limits           Other Findings              Physical Exam    Airway  Mallampati: III  TM Distance: 4 - 6 cm  Neck ROM: normal range of motion, short neck   Mouth opening: Normal     Cardiovascular  Regular rate and rhythm,  S1 and S2 normal,  no murmur, click, rub, or gallop             Dental         Pulmonary  Breath sounds clear to auscultation               Abdominal  GI exam deferred       Other Findings            Anesthetic Plan    ASA: 3  Anesthesia type: general          Induction: Intravenous  Anesthetic plan and risks discussed with: Patient

## 2021-08-24 NOTE — OP NOTES
[facility]    Name:  Kieran Leonard  MR#:   [id number]  :  1960  ACCOUNT #:  [de-identified]  DATE OF SERVICE:  2021    PREOPERATIVE DIAGNOSIS:  Gross hematuria. POSTOPERATIVE DIAGNOSES:  Gross hematuria and neoplasm of bladder with unspecified behavior. PROCEDURES PERFORMED:  Cystoscopy and transurethral resection of bladder tumor, small. SURGEON:  Rayshawn Winchester MD    ASSISTANT:  None. ANESTHESIA:  General.    COMPLICATIONS:  None. SPECIMENS REMOVED:  Bladder tumor. IMPLANTS:  None. ESTIMATED BLOOD LOSS:  Minimal.    DRAINS:  None. FINDINGS:  The patient had a 1.5-cm papillary sessile tumor at the right lateral wall just lateral to the UO extending towards the bladder neck. It was resected completely, and the UO was preserved without problem. CLINICAL NOTE:  The patient is a 57-year-old gentleman with a history of testicular cancer, lymphoma, prostate cancer who was found to have significant gross hematuria last week. The hematuria cleared up, but he presents for cystoscopy. He already had a negative upper tract evaluation. PROCEDURE:  Preoperatively, the risks and benefits of surgery were described to the patient. The risks include, but are not limited to, bleeding, infection, injury to the bladder, injury to the urethra, injury to the ureter, and possible need for future procedures. The patient understood the risks and signed the informed consent. The patient was taken to the operating room and placed on the OR table in supine position. He was administered general anesthetic. He was administered intravenous antibiotics. He was placed in dorsal lithotomy position and prepped and draped in the usual sterile manner. A 22-Yi cystoscope and sheath were then inserted transurethrally atraumatically under direct vision using a 30-degree lens. Panendoscopy was done.   The urethra was normal.  The prostatic urethra was somewhat friable with some mild varices. Once in the bladder, bilateral ureteral orifices were in normal anatomic position. There were no stones within the bladder; however, there was a 1.5-cm sessile papillary mass just lateral to the right UO. It was not including the UO. There was some space between the UO and the tumor. The tumor was sessile in nature. I then removed the ureteroscope. I then passed a 26-Yoruba bipolar resectoscope and sheath transurethrally atraumatically under direct vision using a 30-degree lens and visual obturator. Once I got in the bladder, I removed the visual obturator and used a bipolar loop resectoscope. I was then able to resect the bladder tumor in the usual manner using pure cutting current. The UO was preserved. All margins of resection were clean. I then cauterized the area, and there was no active bleeding. I evacuated all the bladder pieces and reinspected the area of resection, and there was no bleeding. Some of the mild prostatic varices on the floor of the prostate were then cauterized. I did not go distal to the verumontanum. At this point, the patient had his bladder emptied and the scope was reinserted and the area was inspected again and there was no bleeding. I then removed the scope. The patient was then taken out of lithotomy position, revived from anesthesia, and taken to Recovery in stable condition.       Verona Cabrera MD      [id number]  D:  08/24/2021 10:52  T:  08/24/2021 16:13  JOB #:  [id number]

## 2022-01-14 ENCOUNTER — TRANSCRIBE ORDER (OUTPATIENT)
Dept: SCHEDULING | Age: 62
End: 2022-01-14

## 2022-01-14 DIAGNOSIS — C83.30 RETICULOSARCOMA (HCC): Primary | ICD-10-CM

## 2022-02-14 ENCOUNTER — HOSPITAL ENCOUNTER (OUTPATIENT)
Dept: CT IMAGING | Age: 62
Discharge: HOME | End: 2022-02-14
Attending: SPECIALIST
Payer: OTHER GOVERNMENT

## 2022-02-14 DIAGNOSIS — C83.30 RETICULOSARCOMA (HCC): ICD-10-CM

## 2022-02-14 LAB — CREAT UR-MCNC: 1.3 MG/DL (ref 0.6–1.3)

## 2022-02-14 PROCEDURE — 82565 ASSAY OF CREATININE: CPT

## 2022-02-14 PROCEDURE — 74177 CT ABD & PELVIS W/CONTRAST: CPT

## 2022-02-14 PROCEDURE — 74011000636 HC RX REV CODE- 636: Performed by: SPECIALIST

## 2022-02-14 RX ADMIN — IOPAMIDOL 100 ML: 612 INJECTION, SOLUTION INTRAVENOUS at 15:07

## 2022-06-22 ENCOUNTER — TRANSCRIBE ORDER (OUTPATIENT)
Dept: SCHEDULING | Age: 62
End: 2022-06-22

## 2022-06-22 DIAGNOSIS — C83.30 RETICULOSARCOMA (HCC): Primary | ICD-10-CM

## 2022-07-26 ENCOUNTER — HOSPITAL ENCOUNTER (OUTPATIENT)
Dept: PET IMAGING | Age: 62
Discharge: HOME | End: 2022-07-26
Attending: PHYSICIAN ASSISTANT
Payer: OTHER GOVERNMENT

## 2022-07-26 DIAGNOSIS — C83.30 RETICULOSARCOMA (HCC): ICD-10-CM

## 2022-07-26 PROCEDURE — 74011000250 HC RX REV CODE- 250: Performed by: PHYSICIAN ASSISTANT

## 2022-07-26 PROCEDURE — A9552 F18 FDG: HCPCS

## 2022-07-26 RX ORDER — FLUDEOXYGLUCOSE F-18 200 MCI/ML
5-10 INJECTION INTRAVENOUS ONCE
Status: COMPLETED | OUTPATIENT
Start: 2022-07-26 | End: 2022-07-26

## 2022-07-26 RX ORDER — BARIUM SULFATE 20 MG/ML
450 SUSPENSION ORAL
Status: COMPLETED | OUTPATIENT
Start: 2022-07-26 | End: 2022-07-26

## 2022-07-26 RX ADMIN — FLUDEOXYGLUCOSE F-18 8.52 MILLICURIE: 200 INJECTION INTRAVENOUS at 09:53

## 2022-07-26 RX ADMIN — BARIUM SULFATE 450 ML: 20 SUSPENSION ORAL at 10:26

## (undated) DEVICE — MAJ-1414 SINGLE USE ADPATER BIOPSY VALV: Brand: SINGLE USE ADAPTOR BIOPSY VALVE

## (undated) DEVICE — SNARE POLYP SM W13MMXL240CM SHTH DIA2.4MM OVL FLX DISP

## (undated) DEVICE — SINGLE PORT MANIFOLD: Brand: NEPTUNE 2

## (undated) DEVICE — TRNQT TEXT 1X18IN BLU LF DISP -- CONVERT TO ITEM 362165

## (undated) DEVICE — WRISTBAND ID AD W2.5XL9.5CM RED VYN ADH CLSR UNI-PRINT

## (undated) DEVICE — CUTTING LOOP, BIPOLAR, 24/26 FR.: Brand: N.A.

## (undated) DEVICE — NDL PRT INJ NSAF BLNT 18GX1.5 --

## (undated) DEVICE — SYR 5ML 1/5 GRAD LL NSAF LF --

## (undated) DEVICE — SOLUTION IRRIG 3000ML 0.9% SOD CHL FLX CONT 0797208] ICU MEDICAL INC]

## (undated) DEVICE — CATH IV SAFE STR 22GX1IN BLU -- PROTECTIV PLUS

## (undated) DEVICE — GARMENT,MEDLINE,DVT,INT,CALF,MED, GEN2: Brand: MEDLINE

## (undated) DEVICE — ENDO CARRY-ON PROCEDURE KIT INCLUDES ENZYMATIC SPONGE, GAUZE, BIOHAZARD LABEL, TRAY, LUBRICANT, DIRTY SCOPE LABEL, WATER LABEL, TRAY, DRAWSTRING PAD, AND DEFENDO 4-PIECE KIT.: Brand: ENDO CARRY-ON PROCEDURE KIT

## (undated) DEVICE — BAG URIN LEG DISPOZ-A-BG 19OZ -- W/18IN EXT TUBING

## (undated) DEVICE — SPONGE GZ W4XL4IN RAYON POLY 4 PLY NONWOVEN FASTER WICKING

## (undated) DEVICE — NDL FLTR TIP 5 MIC 18GX1.5IN --

## (undated) DEVICE — CATH SUC CTRL PRT TRIFLO 14FR --

## (undated) DEVICE — BASIC SINGLE BASIN 1-LF: Brand: MEDLINE INDUSTRIES, INC.

## (undated) DEVICE — SOL IRR STRL H2O 1000ML BTL --

## (undated) DEVICE — TRAP SPEC COLL POLYP POLYSTYR --

## (undated) DEVICE — MEDI-VAC NON-CONDUCTIVE SUCTION TUBING: Brand: CARDINAL HEALTH

## (undated) DEVICE — SOLUTION IV 500ML 0.9% SOD CHL FLX CONT

## (undated) DEVICE — TRAY PREP DRY W/ PREM GLV 2 APPL 6 SPNG 2 UNDPD 1 OVERWRAP

## (undated) DEVICE — SET ADMIN 16ML TBNG L100IN 2 Y INJ SITE IV PIGGY BK DISP

## (undated) DEVICE — SYRINGE 50ML E/T

## (undated) DEVICE — SOLUTION IRRIG 3000ML H2O STRL BAG

## (undated) DEVICE — REM POLYHESIVE ADULT PATIENT RETURN ELECTRODE: Brand: VALLEYLAB

## (undated) DEVICE — KENDALL RADIOLUCENT FOAM MONITORING ELECTRODE RECTANGULAR SHAPE: Brand: KENDALL

## (undated) DEVICE — TOWEL,OR,DSP,ST,BLUE,STD,4/PK,20PK/CS: Brand: MEDLINE

## (undated) DEVICE — PAD,NON-ADHERENT,3X8,STERILE,LF,1/PK: Brand: MEDLINE

## (undated) DEVICE — SPONGE GZ W4XL4IN COT 12 PLY TYP VII WVN C FLD DSGN

## (undated) DEVICE — MARKER,SKIN,WI/RULER AND LABELS: Brand: MEDLINE

## (undated) DEVICE — STRAP,POSITIONING,KNEE/BODY,FOAM,4X60": Brand: MEDLINE

## (undated) DEVICE — CANNULA CUSH AD W/ 14FT TBG

## (undated) DEVICE — STERILE LATEX POWDER-FREE SURGICAL GLOVESWITH NITRILE COATING: Brand: PROTEXIS

## (undated) DEVICE — CYSTO PACK: Brand: MEDLINE INDUSTRIES, INC.

## (undated) DEVICE — SYR 3ML LL TIP 1/10ML GRAD --

## (undated) DEVICE — GLOVE ORANGE PI 7 1/2   MSG9075